# Patient Record
Sex: MALE | Race: WHITE | NOT HISPANIC OR LATINO | Employment: FULL TIME | URBAN - METROPOLITAN AREA
[De-identification: names, ages, dates, MRNs, and addresses within clinical notes are randomized per-mention and may not be internally consistent; named-entity substitution may affect disease eponyms.]

---

## 2017-02-02 ENCOUNTER — OFFICE VISIT - HEALTHEAST (OUTPATIENT)
Dept: FAMILY MEDICINE | Facility: CLINIC | Age: 62
End: 2017-02-02

## 2017-02-02 DIAGNOSIS — E78.00 HYPERCHOLESTEROLEMIA: ICD-10-CM

## 2017-02-02 DIAGNOSIS — E11.9 DIABETES MELLITUS (H): ICD-10-CM

## 2017-02-02 DIAGNOSIS — D36.9 ADENOMATOUS POLYP: ICD-10-CM

## 2017-02-02 DIAGNOSIS — I10 ESSENTIAL HYPERTENSION: ICD-10-CM

## 2017-02-02 LAB — HBA1C MFR BLD: 8.3 % (ref 3.5–6)

## 2017-02-02 ASSESSMENT — MIFFLIN-ST. JEOR: SCORE: 1608.86

## 2017-02-07 ENCOUNTER — COMMUNICATION - HEALTHEAST (OUTPATIENT)
Dept: FAMILY MEDICINE | Facility: CLINIC | Age: 62
End: 2017-02-07

## 2017-04-30 ENCOUNTER — COMMUNICATION - HEALTHEAST (OUTPATIENT)
Dept: FAMILY MEDICINE | Facility: CLINIC | Age: 62
End: 2017-04-30

## 2017-04-30 DIAGNOSIS — K21.9 GERD (GASTROESOPHAGEAL REFLUX DISEASE): ICD-10-CM

## 2017-05-04 ENCOUNTER — COMMUNICATION - HEALTHEAST (OUTPATIENT)
Dept: FAMILY MEDICINE | Facility: CLINIC | Age: 62
End: 2017-05-04

## 2017-05-04 DIAGNOSIS — K21.9 GERD (GASTROESOPHAGEAL REFLUX DISEASE): ICD-10-CM

## 2017-05-04 DIAGNOSIS — E11.9 TYPE 2 DIABETES MELLITUS (H): ICD-10-CM

## 2017-05-10 ENCOUNTER — RECORDS - HEALTHEAST (OUTPATIENT)
Dept: ADMINISTRATIVE | Facility: OTHER | Age: 62
End: 2017-05-10

## 2017-06-02 ENCOUNTER — OFFICE VISIT - HEALTHEAST (OUTPATIENT)
Dept: FAMILY MEDICINE | Facility: CLINIC | Age: 62
End: 2017-06-02

## 2017-06-02 ENCOUNTER — COMMUNICATION - HEALTHEAST (OUTPATIENT)
Dept: FAMILY MEDICINE | Facility: CLINIC | Age: 62
End: 2017-06-02

## 2017-06-02 DIAGNOSIS — E11.9 TYPE 2 DIABETES MELLITUS (H): ICD-10-CM

## 2017-06-02 DIAGNOSIS — I10 ESSENTIAL HYPERTENSION: ICD-10-CM

## 2017-06-02 DIAGNOSIS — E11.9 DIABETES MELLITUS (H): ICD-10-CM

## 2017-06-02 DIAGNOSIS — K21.9 GERD (GASTROESOPHAGEAL REFLUX DISEASE): ICD-10-CM

## 2017-06-02 DIAGNOSIS — E78.00 HYPERCHOLESTEROLEMIA: ICD-10-CM

## 2017-06-02 LAB — HBA1C MFR BLD: 10.1 % (ref 3.5–6)

## 2017-06-02 ASSESSMENT — MIFFLIN-ST. JEOR: SCORE: 1581.64

## 2017-06-05 ENCOUNTER — COMMUNICATION - HEALTHEAST (OUTPATIENT)
Dept: FAMILY MEDICINE | Facility: CLINIC | Age: 62
End: 2017-06-05

## 2017-09-11 ENCOUNTER — COMMUNICATION - HEALTHEAST (OUTPATIENT)
Dept: FAMILY MEDICINE | Facility: CLINIC | Age: 62
End: 2017-09-11

## 2017-09-11 DIAGNOSIS — E78.00 HYPERCHOLESTEROLEMIA: ICD-10-CM

## 2017-09-11 DIAGNOSIS — E11.9 DIABETES MELLITUS (H): ICD-10-CM

## 2017-09-14 ENCOUNTER — OFFICE VISIT - HEALTHEAST (OUTPATIENT)
Dept: FAMILY MEDICINE | Facility: CLINIC | Age: 62
End: 2017-09-14

## 2017-09-14 DIAGNOSIS — E11.9 DIABETES MELLITUS (H): ICD-10-CM

## 2017-09-14 DIAGNOSIS — I10 ESSENTIAL HYPERTENSION: ICD-10-CM

## 2017-09-14 DIAGNOSIS — B07.9 WART: ICD-10-CM

## 2017-09-14 DIAGNOSIS — L98.9 LESION OF SKIN OF SCALP: ICD-10-CM

## 2017-09-14 DIAGNOSIS — E78.00 HYPERCHOLESTEROLEMIA: ICD-10-CM

## 2017-09-14 LAB — HBA1C MFR BLD: 6.7 % (ref 3.5–6)

## 2017-09-14 ASSESSMENT — MIFFLIN-ST. JEOR: SCORE: 1600.92

## 2017-09-15 ENCOUNTER — COMMUNICATION - HEALTHEAST (OUTPATIENT)
Dept: FAMILY MEDICINE | Facility: CLINIC | Age: 62
End: 2017-09-15

## 2017-09-19 ENCOUNTER — AMBULATORY - HEALTHEAST (OUTPATIENT)
Dept: LAB | Facility: CLINIC | Age: 62
End: 2017-09-19

## 2017-09-19 ENCOUNTER — COMMUNICATION - HEALTHEAST (OUTPATIENT)
Dept: FAMILY MEDICINE | Facility: CLINIC | Age: 62
End: 2017-09-19

## 2017-09-19 DIAGNOSIS — E78.00 HYPERCHOLESTEROLEMIA: ICD-10-CM

## 2017-09-19 LAB
CHOLEST SERPL-MCNC: 174 MG/DL
FASTING STATUS PATIENT QL REPORTED: YES
HDLC SERPL-MCNC: 42 MG/DL
LDLC SERPL CALC-MCNC: 102 MG/DL
TRIGL SERPL-MCNC: 151 MG/DL

## 2017-11-20 ENCOUNTER — OFFICE VISIT - HEALTHEAST (OUTPATIENT)
Dept: FAMILY MEDICINE | Facility: CLINIC | Age: 62
End: 2017-11-20

## 2017-11-20 DIAGNOSIS — M67.431 GANGLION CYST OF WRIST, RIGHT: ICD-10-CM

## 2017-12-14 ENCOUNTER — OFFICE VISIT - HEALTHEAST (OUTPATIENT)
Dept: FAMILY MEDICINE | Facility: CLINIC | Age: 62
End: 2017-12-14

## 2017-12-14 DIAGNOSIS — E11.9 DIABETES MELLITUS (H): ICD-10-CM

## 2017-12-14 DIAGNOSIS — E78.00 HYPERCHOLESTEROLEMIA: ICD-10-CM

## 2017-12-14 DIAGNOSIS — I10 ESSENTIAL HYPERTENSION: ICD-10-CM

## 2017-12-14 LAB — HBA1C MFR BLD: 7.4 % (ref 3.5–6)

## 2017-12-14 ASSESSMENT — MIFFLIN-ST. JEOR: SCORE: 1627

## 2017-12-15 ENCOUNTER — RECORDS - HEALTHEAST (OUTPATIENT)
Dept: ADMINISTRATIVE | Facility: OTHER | Age: 62
End: 2017-12-15

## 2017-12-15 ENCOUNTER — COMMUNICATION - HEALTHEAST (OUTPATIENT)
Dept: FAMILY MEDICINE | Facility: CLINIC | Age: 62
End: 2017-12-15

## 2017-12-15 DIAGNOSIS — E11.9 DIABETES MELLITUS (H): ICD-10-CM

## 2017-12-19 ENCOUNTER — COMMUNICATION - HEALTHEAST (OUTPATIENT)
Dept: FAMILY MEDICINE | Facility: CLINIC | Age: 62
End: 2017-12-19

## 2017-12-19 DIAGNOSIS — E11.9 DIABETES MELLITUS (H): ICD-10-CM

## 2017-12-19 RX ORDER — GLUCOSAMINE HCL/CHONDROITIN SU 500-400 MG
1 CAPSULE ORAL 2 TIMES DAILY
Qty: 250 STRIP | Refills: 12 | Status: SHIPPED | OUTPATIENT
Start: 2017-12-19 | End: 2022-09-28

## 2017-12-20 ENCOUNTER — AMBULATORY - HEALTHEAST (OUTPATIENT)
Dept: NURSING | Facility: CLINIC | Age: 62
End: 2017-12-20

## 2017-12-20 DIAGNOSIS — Z00.00 HEALTHCARE MAINTENANCE: ICD-10-CM

## 2018-03-15 ENCOUNTER — OFFICE VISIT - HEALTHEAST (OUTPATIENT)
Dept: FAMILY MEDICINE | Facility: CLINIC | Age: 63
End: 2018-03-15

## 2018-03-15 DIAGNOSIS — R80.9 URINE TEST POSITIVE FOR MICROALBUMINURIA: ICD-10-CM

## 2018-03-15 DIAGNOSIS — I10 ESSENTIAL HYPERTENSION: ICD-10-CM

## 2018-03-15 DIAGNOSIS — M54.2 MUSCULOSKELETAL NECK PAIN: ICD-10-CM

## 2018-03-15 DIAGNOSIS — E11.9 TYPE 2 DIABETES MELLITUS (H): ICD-10-CM

## 2018-03-15 LAB
ALBUMIN SERPL-MCNC: 3.9 G/DL (ref 3.5–5)
ALP SERPL-CCNC: 70 U/L (ref 45–120)
ALT SERPL W P-5'-P-CCNC: 47 U/L (ref 0–45)
ANION GAP SERPL CALCULATED.3IONS-SCNC: 10 MMOL/L (ref 5–18)
AST SERPL W P-5'-P-CCNC: 20 U/L (ref 0–40)
BILIRUB SERPL-MCNC: 0.5 MG/DL (ref 0–1)
BUN SERPL-MCNC: 15 MG/DL (ref 8–22)
CALCIUM SERPL-MCNC: 9.8 MG/DL (ref 8.5–10.5)
CHLORIDE BLD-SCNC: 105 MMOL/L (ref 98–107)
CO2 SERPL-SCNC: 26 MMOL/L (ref 22–31)
CREAT SERPL-MCNC: 0.9 MG/DL (ref 0.7–1.3)
GFR SERPL CREATININE-BSD FRML MDRD: >60 ML/MIN/1.73M2
GLUCOSE BLD-MCNC: 93 MG/DL (ref 70–125)
HBA1C MFR BLD: 7.3 % (ref 3.5–6)
POTASSIUM BLD-SCNC: 4.5 MMOL/L (ref 3.5–5)
PROT SERPL-MCNC: 6.8 G/DL (ref 6–8)
SODIUM SERPL-SCNC: 141 MMOL/L (ref 136–145)

## 2018-03-15 ASSESSMENT — MIFFLIN-ST. JEOR: SCORE: 1622.47

## 2018-03-16 ENCOUNTER — COMMUNICATION - HEALTHEAST (OUTPATIENT)
Dept: FAMILY MEDICINE | Facility: CLINIC | Age: 63
End: 2018-03-16

## 2018-03-30 ENCOUNTER — OFFICE VISIT - HEALTHEAST (OUTPATIENT)
Dept: PHYSICAL THERAPY | Facility: REHABILITATION | Age: 63
End: 2018-03-30

## 2018-03-30 DIAGNOSIS — R29.3 ABNORMAL POSTURE: ICD-10-CM

## 2018-03-30 DIAGNOSIS — M43.6 NECK STIFFNESS: ICD-10-CM

## 2018-03-30 DIAGNOSIS — M54.2 NECK PAIN ON RIGHT SIDE: ICD-10-CM

## 2018-04-11 ENCOUNTER — OFFICE VISIT - HEALTHEAST (OUTPATIENT)
Dept: PHYSICAL THERAPY | Facility: REHABILITATION | Age: 63
End: 2018-04-11

## 2018-04-11 DIAGNOSIS — M43.6 NECK STIFFNESS: ICD-10-CM

## 2018-04-11 DIAGNOSIS — M54.2 NECK PAIN ON RIGHT SIDE: ICD-10-CM

## 2018-04-11 DIAGNOSIS — R29.3 ABNORMAL POSTURE: ICD-10-CM

## 2018-04-16 ENCOUNTER — OFFICE VISIT - HEALTHEAST (OUTPATIENT)
Dept: PHYSICAL THERAPY | Facility: REHABILITATION | Age: 63
End: 2018-04-16

## 2018-04-16 DIAGNOSIS — M54.2 NECK PAIN ON RIGHT SIDE: ICD-10-CM

## 2018-04-16 DIAGNOSIS — M43.6 NECK STIFFNESS: ICD-10-CM

## 2018-04-16 DIAGNOSIS — R29.3 ABNORMAL POSTURE: ICD-10-CM

## 2018-04-18 ENCOUNTER — OFFICE VISIT - HEALTHEAST (OUTPATIENT)
Dept: PHYSICAL THERAPY | Facility: REHABILITATION | Age: 63
End: 2018-04-18

## 2018-04-18 DIAGNOSIS — M43.6 NECK STIFFNESS: ICD-10-CM

## 2018-04-18 DIAGNOSIS — M54.2 NECK PAIN ON RIGHT SIDE: ICD-10-CM

## 2018-04-18 DIAGNOSIS — R29.3 ABNORMAL POSTURE: ICD-10-CM

## 2018-04-27 ENCOUNTER — OFFICE VISIT - HEALTHEAST (OUTPATIENT)
Dept: PHYSICAL THERAPY | Facility: REHABILITATION | Age: 63
End: 2018-04-27

## 2018-04-27 DIAGNOSIS — M43.6 NECK STIFFNESS: ICD-10-CM

## 2018-04-27 DIAGNOSIS — R29.3 ABNORMAL POSTURE: ICD-10-CM

## 2018-04-27 DIAGNOSIS — M54.2 NECK PAIN ON RIGHT SIDE: ICD-10-CM

## 2018-06-11 ENCOUNTER — COMMUNICATION - HEALTHEAST (OUTPATIENT)
Dept: FAMILY MEDICINE | Facility: CLINIC | Age: 63
End: 2018-06-11

## 2018-06-11 DIAGNOSIS — E11.9 DIABETES MELLITUS (H): ICD-10-CM

## 2018-07-02 ENCOUNTER — COMMUNICATION - HEALTHEAST (OUTPATIENT)
Dept: FAMILY MEDICINE | Facility: CLINIC | Age: 63
End: 2018-07-02

## 2018-07-02 DIAGNOSIS — E11.9 DIABETES MELLITUS (H): ICD-10-CM

## 2018-07-02 DIAGNOSIS — E11.9 TYPE 2 DIABETES MELLITUS (H): ICD-10-CM

## 2018-07-16 ENCOUNTER — OFFICE VISIT - HEALTHEAST (OUTPATIENT)
Dept: FAMILY MEDICINE | Facility: CLINIC | Age: 63
End: 2018-07-16

## 2018-07-16 DIAGNOSIS — E78.00 HYPERCHOLESTEROLEMIA: ICD-10-CM

## 2018-07-16 DIAGNOSIS — E11.9 TYPE 2 DIABETES MELLITUS (H): ICD-10-CM

## 2018-07-16 DIAGNOSIS — I10 ESSENTIAL HYPERTENSION: ICD-10-CM

## 2018-07-16 LAB
ALBUMIN SERPL-MCNC: 4.3 G/DL (ref 3.5–5)
ALP SERPL-CCNC: 70 U/L (ref 45–120)
ALT SERPL W P-5'-P-CCNC: 30 U/L (ref 0–45)
ANION GAP SERPL CALCULATED.3IONS-SCNC: 10 MMOL/L (ref 5–18)
AST SERPL W P-5'-P-CCNC: 16 U/L (ref 0–40)
BILIRUB SERPL-MCNC: 0.4 MG/DL (ref 0–1)
BUN SERPL-MCNC: 13 MG/DL (ref 8–22)
CALCIUM SERPL-MCNC: 9.7 MG/DL (ref 8.5–10.5)
CHLORIDE BLD-SCNC: 105 MMOL/L (ref 98–107)
CO2 SERPL-SCNC: 26 MMOL/L (ref 22–31)
CREAT SERPL-MCNC: 0.92 MG/DL (ref 0.7–1.3)
CREAT UR-MCNC: 221.2 MG/DL
GFR SERPL CREATININE-BSD FRML MDRD: >60 ML/MIN/1.73M2
GLUCOSE BLD-MCNC: 128 MG/DL (ref 70–125)
HBA1C MFR BLD: 7.1 % (ref 3.5–6)
MICROALBUMIN UR-MCNC: 1.35 MG/DL (ref 0–1.99)
MICROALBUMIN/CREAT UR: 6.1 MG/G
POTASSIUM BLD-SCNC: 4.7 MMOL/L (ref 3.5–5)
PROT SERPL-MCNC: 6.8 G/DL (ref 6–8)
SODIUM SERPL-SCNC: 141 MMOL/L (ref 136–145)

## 2018-07-16 ASSESSMENT — MIFFLIN-ST. JEOR: SCORE: 1617.93

## 2018-07-17 ENCOUNTER — COMMUNICATION - HEALTHEAST (OUTPATIENT)
Dept: FAMILY MEDICINE | Facility: CLINIC | Age: 63
End: 2018-07-17

## 2018-07-24 ENCOUNTER — COMMUNICATION - HEALTHEAST (OUTPATIENT)
Dept: FAMILY MEDICINE | Facility: CLINIC | Age: 63
End: 2018-07-24

## 2018-07-24 DIAGNOSIS — K21.9 GERD (GASTROESOPHAGEAL REFLUX DISEASE): ICD-10-CM

## 2018-10-15 ENCOUNTER — OFFICE VISIT - HEALTHEAST (OUTPATIENT)
Dept: FAMILY MEDICINE | Facility: CLINIC | Age: 63
End: 2018-10-15

## 2018-10-15 DIAGNOSIS — S39.012A STRAIN OF LUMBAR REGION, INITIAL ENCOUNTER: ICD-10-CM

## 2018-10-18 ENCOUNTER — OFFICE VISIT - HEALTHEAST (OUTPATIENT)
Dept: FAMILY MEDICINE | Facility: CLINIC | Age: 63
End: 2018-10-18

## 2018-10-18 DIAGNOSIS — M54.9 BACK PAIN: ICD-10-CM

## 2018-10-18 ASSESSMENT — MIFFLIN-ST. JEOR: SCORE: 1627

## 2018-11-19 ENCOUNTER — OFFICE VISIT - HEALTHEAST (OUTPATIENT)
Dept: FAMILY MEDICINE | Facility: CLINIC | Age: 63
End: 2018-11-19

## 2018-11-19 DIAGNOSIS — E11.8 TYPE 2 DIABETES MELLITUS WITH COMPLICATION, WITHOUT LONG-TERM CURRENT USE OF INSULIN (H): ICD-10-CM

## 2018-11-19 DIAGNOSIS — N52.9 ERECTILE DYSFUNCTION, UNSPECIFIED ERECTILE DYSFUNCTION TYPE: ICD-10-CM

## 2018-11-19 LAB
ALBUMIN SERPL-MCNC: 4.1 G/DL (ref 3.5–5)
ALP SERPL-CCNC: 77 U/L (ref 45–120)
ALT SERPL W P-5'-P-CCNC: 35 U/L (ref 0–45)
ANION GAP SERPL CALCULATED.3IONS-SCNC: 12 MMOL/L (ref 5–18)
AST SERPL W P-5'-P-CCNC: 18 U/L (ref 0–40)
BILIRUB SERPL-MCNC: 0.4 MG/DL (ref 0–1)
BUN SERPL-MCNC: 12 MG/DL (ref 8–22)
CALCIUM SERPL-MCNC: 9.8 MG/DL (ref 8.5–10.5)
CHLORIDE BLD-SCNC: 105 MMOL/L (ref 98–107)
CO2 SERPL-SCNC: 25 MMOL/L (ref 22–31)
CREAT SERPL-MCNC: 0.84 MG/DL (ref 0.7–1.3)
GFR SERPL CREATININE-BSD FRML MDRD: >60 ML/MIN/1.73M2
GLUCOSE BLD-MCNC: 97 MG/DL (ref 70–125)
HBA1C MFR BLD: 7 % (ref 3.5–6)
POTASSIUM BLD-SCNC: 4.8 MMOL/L (ref 3.5–5)
PROT SERPL-MCNC: 7 G/DL (ref 6–8)
SODIUM SERPL-SCNC: 142 MMOL/L (ref 136–145)

## 2018-11-19 ASSESSMENT — MIFFLIN-ST. JEOR: SCORE: 1617.93

## 2018-11-20 ENCOUNTER — COMMUNICATION - HEALTHEAST (OUTPATIENT)
Dept: FAMILY MEDICINE | Facility: CLINIC | Age: 63
End: 2018-11-20

## 2018-12-17 ENCOUNTER — RECORDS - HEALTHEAST (OUTPATIENT)
Dept: ADMINISTRATIVE | Facility: OTHER | Age: 63
End: 2018-12-17

## 2019-02-21 ENCOUNTER — OFFICE VISIT - HEALTHEAST (OUTPATIENT)
Dept: FAMILY MEDICINE | Facility: CLINIC | Age: 64
End: 2019-02-21

## 2019-02-21 DIAGNOSIS — E11.8 TYPE 2 DIABETES MELLITUS WITH COMPLICATION, WITHOUT LONG-TERM CURRENT USE OF INSULIN (H): ICD-10-CM

## 2019-02-21 DIAGNOSIS — N52.9 ERECTILE DYSFUNCTION, UNSPECIFIED ERECTILE DYSFUNCTION TYPE: ICD-10-CM

## 2019-02-21 LAB — HBA1C MFR BLD: 7.3 % (ref 3.5–6)

## 2019-02-21 ASSESSMENT — MIFFLIN-ST. JEOR: SCORE: 1617.93

## 2019-02-22 ENCOUNTER — COMMUNICATION - HEALTHEAST (OUTPATIENT)
Dept: FAMILY MEDICINE | Facility: CLINIC | Age: 64
End: 2019-02-22

## 2019-02-23 ENCOUNTER — COMMUNICATION - HEALTHEAST (OUTPATIENT)
Dept: FAMILY MEDICINE | Facility: CLINIC | Age: 64
End: 2019-02-23

## 2019-02-23 DIAGNOSIS — E11.9 DIABETES MELLITUS (H): ICD-10-CM

## 2019-02-23 DIAGNOSIS — E78.00 HYPERCHOLESTEROLEMIA: ICD-10-CM

## 2019-02-27 ENCOUNTER — COMMUNICATION - HEALTHEAST (OUTPATIENT)
Dept: FAMILY MEDICINE | Facility: CLINIC | Age: 64
End: 2019-02-27

## 2019-02-27 DIAGNOSIS — E11.9 DIABETES MELLITUS (H): ICD-10-CM

## 2019-05-21 ENCOUNTER — COMMUNICATION - HEALTHEAST (OUTPATIENT)
Dept: FAMILY MEDICINE | Facility: CLINIC | Age: 64
End: 2019-05-21

## 2019-05-21 DIAGNOSIS — E11.9 DIABETES MELLITUS (H): ICD-10-CM

## 2019-05-26 ENCOUNTER — COMMUNICATION - HEALTHEAST (OUTPATIENT)
Dept: FAMILY MEDICINE | Facility: CLINIC | Age: 64
End: 2019-05-26

## 2019-05-26 DIAGNOSIS — E11.9 TYPE 2 DIABETES MELLITUS (H): ICD-10-CM

## 2019-05-26 DIAGNOSIS — E11.9 DIABETES MELLITUS (H): ICD-10-CM

## 2019-06-29 ENCOUNTER — COMMUNICATION - HEALTHEAST (OUTPATIENT)
Dept: FAMILY MEDICINE | Facility: CLINIC | Age: 64
End: 2019-06-29

## 2019-06-29 DIAGNOSIS — E11.9 DIABETES MELLITUS (H): ICD-10-CM

## 2019-07-15 ENCOUNTER — COMMUNICATION - HEALTHEAST (OUTPATIENT)
Dept: FAMILY MEDICINE | Facility: CLINIC | Age: 64
End: 2019-07-15

## 2019-07-15 DIAGNOSIS — R12 HEARTBURN: ICD-10-CM

## 2019-07-17 ENCOUNTER — RECORDS - HEALTHEAST (OUTPATIENT)
Dept: HEALTH INFORMATION MANAGEMENT | Facility: CLINIC | Age: 64
End: 2019-07-17

## 2019-07-29 ENCOUNTER — OFFICE VISIT - HEALTHEAST (OUTPATIENT)
Dept: FAMILY MEDICINE | Facility: CLINIC | Age: 64
End: 2019-07-29

## 2019-07-29 DIAGNOSIS — Z11.4 SCREENING FOR HIV (HUMAN IMMUNODEFICIENCY VIRUS): ICD-10-CM

## 2019-07-29 DIAGNOSIS — Z11.59 ENCOUNTER FOR HEPATITIS C SCREENING TEST FOR LOW RISK PATIENT: ICD-10-CM

## 2019-07-29 DIAGNOSIS — M67.40 MUCOID CYST OF JOINT: ICD-10-CM

## 2019-07-29 DIAGNOSIS — L57.0 ACTINIC KERATOSIS: ICD-10-CM

## 2019-07-29 DIAGNOSIS — E11.9 TYPE 2 DIABETES MELLITUS (H): ICD-10-CM

## 2019-07-29 DIAGNOSIS — M76.31 IT BAND SYNDROME, RIGHT: ICD-10-CM

## 2019-07-29 LAB
CHOLEST SERPL-MCNC: 186 MG/DL
CREAT UR-MCNC: 246.3 MG/DL
FASTING STATUS PATIENT QL REPORTED: NO
HBA1C MFR BLD: 8.1 % (ref 3.5–6)
HDLC SERPL-MCNC: 43 MG/DL
HIV 1+2 AB+HIV1 P24 AG SERPL QL IA: NEGATIVE
LDLC SERPL CALC-MCNC: 104 MG/DL
MICROALBUMIN UR-MCNC: 2.62 MG/DL (ref 0–1.99)
MICROALBUMIN/CREAT UR: 10.6 MG/G
TRIGL SERPL-MCNC: 197 MG/DL

## 2019-07-29 ASSESSMENT — MIFFLIN-ST. JEOR: SCORE: 1608.86

## 2019-07-30 LAB — HCV AB SERPL QL IA: NEGATIVE

## 2019-08-08 ENCOUNTER — RECORDS - HEALTHEAST (OUTPATIENT)
Dept: ADMINISTRATIVE | Facility: OTHER | Age: 64
End: 2019-08-08

## 2019-09-09 ENCOUNTER — OFFICE VISIT - HEALTHEAST (OUTPATIENT)
Dept: FAMILY MEDICINE | Facility: CLINIC | Age: 64
End: 2019-09-09

## 2019-09-09 DIAGNOSIS — L57.0 ACTINIC KERATOSIS: ICD-10-CM

## 2019-09-09 ASSESSMENT — MIFFLIN-ST. JEOR: SCORE: 1613.4

## 2019-11-04 ENCOUNTER — OFFICE VISIT - HEALTHEAST (OUTPATIENT)
Dept: FAMILY MEDICINE | Facility: CLINIC | Age: 64
End: 2019-11-04

## 2019-11-04 DIAGNOSIS — L57.0 ACTINIC KERATOSIS: ICD-10-CM

## 2019-11-04 DIAGNOSIS — N52.9 ERECTILE DYSFUNCTION, UNSPECIFIED ERECTILE DYSFUNCTION TYPE: ICD-10-CM

## 2019-11-04 ASSESSMENT — MIFFLIN-ST. JEOR: SCORE: 1627

## 2019-11-12 ENCOUNTER — COMMUNICATION - HEALTHEAST (OUTPATIENT)
Dept: FAMILY MEDICINE | Facility: CLINIC | Age: 64
End: 2019-11-12

## 2019-11-12 DIAGNOSIS — E11.9 DIABETES MELLITUS (H): ICD-10-CM

## 2020-01-09 ENCOUNTER — RECORDS - HEALTHEAST (OUTPATIENT)
Dept: ADMINISTRATIVE | Facility: OTHER | Age: 65
End: 2020-01-09

## 2020-01-30 ENCOUNTER — OFFICE VISIT - HEALTHEAST (OUTPATIENT)
Dept: FAMILY MEDICINE | Facility: CLINIC | Age: 65
End: 2020-01-30

## 2020-01-30 DIAGNOSIS — E11.9 DIABETES MELLITUS (H): ICD-10-CM

## 2020-01-30 DIAGNOSIS — L57.0 ACTINIC KERATOSIS: ICD-10-CM

## 2020-01-30 DIAGNOSIS — L98.9 SKIN LESION: ICD-10-CM

## 2020-01-30 DIAGNOSIS — I10 ESSENTIAL HYPERTENSION: ICD-10-CM

## 2020-01-30 DIAGNOSIS — E11.65 TYPE 2 DIABETES MELLITUS WITH HYPERGLYCEMIA, WITHOUT LONG-TERM CURRENT USE OF INSULIN (H): ICD-10-CM

## 2020-01-30 DIAGNOSIS — E78.00 HYPERCHOLESTEROLEMIA: ICD-10-CM

## 2020-01-30 LAB
ALBUMIN SERPL-MCNC: 4.1 G/DL (ref 3.5–5)
ALP SERPL-CCNC: 72 U/L (ref 45–120)
ALT SERPL W P-5'-P-CCNC: 38 U/L (ref 0–45)
ANION GAP SERPL CALCULATED.3IONS-SCNC: 8 MMOL/L (ref 5–18)
AST SERPL W P-5'-P-CCNC: 17 U/L (ref 0–40)
BILIRUB SERPL-MCNC: 0.6 MG/DL (ref 0–1)
BUN SERPL-MCNC: 11 MG/DL (ref 8–22)
CALCIUM SERPL-MCNC: 9.5 MG/DL (ref 8.5–10.5)
CHLORIDE BLD-SCNC: 104 MMOL/L (ref 98–107)
CO2 SERPL-SCNC: 29 MMOL/L (ref 22–31)
CREAT SERPL-MCNC: 0.94 MG/DL (ref 0.7–1.3)
GFR SERPL CREATININE-BSD FRML MDRD: >60 ML/MIN/1.73M2
GLUCOSE BLD-MCNC: 161 MG/DL (ref 70–125)
HBA1C MFR BLD: 8.9 % (ref 3.5–6)
POTASSIUM BLD-SCNC: 4.2 MMOL/L (ref 3.5–5)
PROT SERPL-MCNC: 6.6 G/DL (ref 6–8)
SODIUM SERPL-SCNC: 141 MMOL/L (ref 136–145)

## 2020-01-30 ASSESSMENT — MIFFLIN-ST. JEOR: SCORE: 1621.34

## 2020-01-31 LAB
LAB AP CHARGES (HE HISTORICAL CONVERSION): NORMAL
PATH REPORT.COMMENTS IMP SPEC: NORMAL
PATH REPORT.FINAL DX SPEC: NORMAL
PATH REPORT.GROSS SPEC: NORMAL
PATH REPORT.MICROSCOPIC SPEC OTHER STN: NORMAL
PATH REPORT.RELEVANT HX SPEC: NORMAL
RESULT FLAG (HE HISTORICAL CONVERSION): NORMAL

## 2020-02-03 ENCOUNTER — RECORDS - HEALTHEAST (OUTPATIENT)
Dept: ADMINISTRATIVE | Facility: OTHER | Age: 65
End: 2020-02-03

## 2020-02-08 ENCOUNTER — COMMUNICATION - HEALTHEAST (OUTPATIENT)
Dept: FAMILY MEDICINE | Facility: CLINIC | Age: 65
End: 2020-02-08

## 2020-02-08 DIAGNOSIS — E11.9 DIABETES MELLITUS (H): ICD-10-CM

## 2020-02-13 ENCOUNTER — OFFICE VISIT - HEALTHEAST (OUTPATIENT)
Dept: FAMILY MEDICINE | Facility: CLINIC | Age: 65
End: 2020-02-13

## 2020-02-13 DIAGNOSIS — L03.90 CELLULITIS, UNSPECIFIED CELLULITIS SITE: ICD-10-CM

## 2020-02-13 ASSESSMENT — MIFFLIN-ST. JEOR: SCORE: 1614.54

## 2020-02-15 ENCOUNTER — COMMUNICATION - HEALTHEAST (OUTPATIENT)
Dept: FAMILY MEDICINE | Facility: CLINIC | Age: 65
End: 2020-02-15

## 2020-02-15 DIAGNOSIS — E11.9 DIABETES MELLITUS (H): ICD-10-CM

## 2020-02-15 DIAGNOSIS — E78.00 HYPERCHOLESTEROLEMIA: ICD-10-CM

## 2020-04-05 ENCOUNTER — COMMUNICATION - HEALTHEAST (OUTPATIENT)
Dept: FAMILY MEDICINE | Facility: CLINIC | Age: 65
End: 2020-04-05

## 2020-04-05 DIAGNOSIS — R12 HEARTBURN: ICD-10-CM

## 2020-05-04 ENCOUNTER — OFFICE VISIT - HEALTHEAST (OUTPATIENT)
Dept: FAMILY MEDICINE | Facility: CLINIC | Age: 65
End: 2020-05-04

## 2020-05-04 DIAGNOSIS — E11.9 DIABETES MELLITUS (H): ICD-10-CM

## 2020-05-04 DIAGNOSIS — E78.00 HYPERCHOLESTEROLEMIA: ICD-10-CM

## 2020-05-04 DIAGNOSIS — E11.9 TYPE 2 DIABETES MELLITUS (H): ICD-10-CM

## 2020-07-13 ENCOUNTER — OFFICE VISIT - HEALTHEAST (OUTPATIENT)
Dept: FAMILY MEDICINE | Facility: CLINIC | Age: 65
End: 2020-07-13

## 2020-07-13 DIAGNOSIS — E11.65 TYPE 2 DIABETES MELLITUS WITH HYPERGLYCEMIA, WITHOUT LONG-TERM CURRENT USE OF INSULIN (H): ICD-10-CM

## 2020-07-13 DIAGNOSIS — E78.00 HYPERCHOLESTEROLEMIA: ICD-10-CM

## 2020-07-13 DIAGNOSIS — E11.9 DIABETES MELLITUS (H): ICD-10-CM

## 2020-07-13 DIAGNOSIS — B07.9 VIRAL WARTS, UNSPECIFIED TYPE: ICD-10-CM

## 2020-07-13 DIAGNOSIS — E11.9 TYPE 2 DIABETES MELLITUS (H): ICD-10-CM

## 2020-07-13 LAB
ALBUMIN SERPL-MCNC: 4.2 G/DL (ref 3.5–5)
ALP SERPL-CCNC: 79 U/L (ref 45–120)
ALT SERPL W P-5'-P-CCNC: 23 U/L (ref 0–45)
ANION GAP SERPL CALCULATED.3IONS-SCNC: 9 MMOL/L (ref 5–18)
AST SERPL W P-5'-P-CCNC: 13 U/L (ref 0–40)
BILIRUB SERPL-MCNC: 0.5 MG/DL (ref 0–1)
BUN SERPL-MCNC: 16 MG/DL (ref 8–22)
CALCIUM SERPL-MCNC: 9.8 MG/DL (ref 8.5–10.5)
CHLORIDE BLD-SCNC: 104 MMOL/L (ref 98–107)
CHOLEST SERPL-MCNC: 190 MG/DL
CO2 SERPL-SCNC: 27 MMOL/L (ref 22–31)
CREAT SERPL-MCNC: 1.08 MG/DL (ref 0.7–1.3)
FASTING STATUS PATIENT QL REPORTED: ABNORMAL
GFR SERPL CREATININE-BSD FRML MDRD: >60 ML/MIN/1.73M2
GLUCOSE BLD-MCNC: 92 MG/DL (ref 70–125)
HBA1C MFR BLD: 8.2 % (ref 3.5–6)
HDLC SERPL-MCNC: 44 MG/DL
LDLC SERPL CALC-MCNC: 106 MG/DL
POTASSIUM BLD-SCNC: 4.4 MMOL/L (ref 3.5–5)
PROT SERPL-MCNC: 7.1 G/DL (ref 6–8)
SODIUM SERPL-SCNC: 140 MMOL/L (ref 136–145)
TRIGL SERPL-MCNC: 198 MG/DL

## 2020-07-13 ASSESSMENT — MIFFLIN-ST. JEOR: SCORE: 1595.26

## 2020-07-14 ENCOUNTER — COMMUNICATION - HEALTHEAST (OUTPATIENT)
Dept: FAMILY MEDICINE | Facility: CLINIC | Age: 65
End: 2020-07-14

## 2020-08-15 ENCOUNTER — COMMUNICATION - HEALTHEAST (OUTPATIENT)
Dept: FAMILY MEDICINE | Facility: CLINIC | Age: 65
End: 2020-08-15

## 2020-08-15 DIAGNOSIS — E11.9 DIABETES MELLITUS (H): ICD-10-CM

## 2020-09-30 ENCOUNTER — COMMUNICATION - HEALTHEAST (OUTPATIENT)
Dept: FAMILY MEDICINE | Facility: CLINIC | Age: 65
End: 2020-09-30

## 2020-09-30 DIAGNOSIS — E11.9 DIABETES MELLITUS (H): ICD-10-CM

## 2020-09-30 DIAGNOSIS — E11.9 TYPE 2 DIABETES MELLITUS (H): ICD-10-CM

## 2020-10-05 RX ORDER — METFORMIN HYDROCHLORIDE 750 MG/1
TABLET, EXTENDED RELEASE ORAL
Qty: 180 TABLET | Refills: 3 | Status: SHIPPED | OUTPATIENT
Start: 2020-10-05 | End: 2022-01-05

## 2020-11-13 ENCOUNTER — OFFICE VISIT - HEALTHEAST (OUTPATIENT)
Dept: FAMILY MEDICINE | Facility: CLINIC | Age: 65
End: 2020-11-13

## 2020-11-13 DIAGNOSIS — E11.65 TYPE 2 DIABETES MELLITUS WITH HYPERGLYCEMIA, WITHOUT LONG-TERM CURRENT USE OF INSULIN (H): ICD-10-CM

## 2020-11-29 ENCOUNTER — COMMUNICATION - HEALTHEAST (OUTPATIENT)
Dept: FAMILY MEDICINE | Facility: CLINIC | Age: 65
End: 2020-11-29

## 2020-11-29 DIAGNOSIS — N52.9 ERECTILE DYSFUNCTION, UNSPECIFIED ERECTILE DYSFUNCTION TYPE: ICD-10-CM

## 2021-01-14 ENCOUNTER — RECORDS - HEALTHEAST (OUTPATIENT)
Dept: ADMINISTRATIVE | Facility: OTHER | Age: 66
End: 2021-01-14

## 2021-01-14 LAB — RETINOPATHY: NEGATIVE

## 2021-01-18 ENCOUNTER — RECORDS - HEALTHEAST (OUTPATIENT)
Dept: HEALTH INFORMATION MANAGEMENT | Facility: CLINIC | Age: 66
End: 2021-01-18

## 2021-01-19 ENCOUNTER — COMMUNICATION - HEALTHEAST (OUTPATIENT)
Dept: FAMILY MEDICINE | Facility: CLINIC | Age: 66
End: 2021-01-19

## 2021-01-19 ENCOUNTER — OFFICE VISIT - HEALTHEAST (OUTPATIENT)
Dept: FAMILY MEDICINE | Facility: CLINIC | Age: 66
End: 2021-01-19

## 2021-01-19 DIAGNOSIS — E11.9 DIABETES MELLITUS (H): ICD-10-CM

## 2021-01-19 DIAGNOSIS — L57.0 ACTINIC KERATOSIS: ICD-10-CM

## 2021-01-19 DIAGNOSIS — I10 ESSENTIAL HYPERTENSION: ICD-10-CM

## 2021-01-19 DIAGNOSIS — R06.83 SNORES: ICD-10-CM

## 2021-01-19 DIAGNOSIS — E11.65 TYPE 2 DIABETES MELLITUS WITH HYPERGLYCEMIA, WITHOUT LONG-TERM CURRENT USE OF INSULIN (H): ICD-10-CM

## 2021-01-19 LAB
ALBUMIN SERPL-MCNC: 4.1 G/DL (ref 3.5–5)
ALP SERPL-CCNC: 70 U/L (ref 45–120)
ALT SERPL W P-5'-P-CCNC: 28 U/L (ref 0–45)
ANION GAP SERPL CALCULATED.3IONS-SCNC: 12 MMOL/L (ref 5–18)
AST SERPL W P-5'-P-CCNC: 13 U/L (ref 0–40)
BILIRUB SERPL-MCNC: 0.5 MG/DL (ref 0–1)
BUN SERPL-MCNC: 15 MG/DL (ref 8–22)
CALCIUM SERPL-MCNC: 9.1 MG/DL (ref 8.5–10.5)
CHLORIDE BLD-SCNC: 105 MMOL/L (ref 98–107)
CO2 SERPL-SCNC: 23 MMOL/L (ref 22–31)
CREAT SERPL-MCNC: 1.01 MG/DL (ref 0.7–1.3)
CREAT UR-MCNC: 447.6 MG/DL
GFR SERPL CREATININE-BSD FRML MDRD: >60 ML/MIN/1.73M2
GLUCOSE BLD-MCNC: 193 MG/DL (ref 70–125)
HBA1C MFR BLD: 8.9 %
MICROALBUMIN UR-MCNC: 5.26 MG/DL (ref 0–1.99)
MICROALBUMIN/CREAT UR: 11.8 MG/G
POTASSIUM BLD-SCNC: 4.3 MMOL/L (ref 3.5–5)
PROT SERPL-MCNC: 6.7 G/DL (ref 6–8)
SODIUM SERPL-SCNC: 140 MMOL/L (ref 136–145)

## 2021-01-19 ASSESSMENT — MIFFLIN-ST. JEOR: SCORE: 1610

## 2021-01-20 ENCOUNTER — COMMUNICATION - HEALTHEAST (OUTPATIENT)
Dept: FAMILY MEDICINE | Facility: CLINIC | Age: 66
End: 2021-01-20

## 2021-01-20 DIAGNOSIS — E11.65 TYPE 2 DIABETES MELLITUS WITH HYPERGLYCEMIA, WITHOUT LONG-TERM CURRENT USE OF INSULIN (H): ICD-10-CM

## 2021-01-21 DIAGNOSIS — I10 ESSENTIAL HYPERTENSION: ICD-10-CM

## 2021-01-21 DIAGNOSIS — R06.83 SNORING: Primary | ICD-10-CM

## 2021-01-26 ENCOUNTER — OFFICE VISIT - HEALTHEAST (OUTPATIENT)
Dept: FAMILY MEDICINE | Facility: CLINIC | Age: 66
End: 2021-01-26

## 2021-01-26 DIAGNOSIS — I10 ESSENTIAL HYPERTENSION: ICD-10-CM

## 2021-01-26 RX ORDER — ADHESIVE BANDAGE 3/4"
1 BANDAGE TOPICAL DAILY
Qty: 1 EACH | Refills: 0 | Status: SHIPPED | OUTPATIENT
Start: 2021-01-26

## 2021-02-06 ENCOUNTER — COMMUNICATION - HEALTHEAST (OUTPATIENT)
Dept: FAMILY MEDICINE | Facility: CLINIC | Age: 66
End: 2021-02-06

## 2021-02-06 DIAGNOSIS — E78.00 HYPERCHOLESTEROLEMIA: ICD-10-CM

## 2021-02-06 DIAGNOSIS — E11.9 DIABETES MELLITUS (H): ICD-10-CM

## 2021-02-06 RX ORDER — ATORVASTATIN CALCIUM 40 MG/1
TABLET, FILM COATED ORAL
Qty: 90 TABLET | Refills: 3 | Status: SHIPPED | OUTPATIENT
Start: 2021-02-06 | End: 2022-01-28

## 2021-02-07 ENCOUNTER — COMMUNICATION - HEALTHEAST (OUTPATIENT)
Dept: FAMILY MEDICINE | Facility: CLINIC | Age: 66
End: 2021-02-07

## 2021-02-07 DIAGNOSIS — E11.9 DIABETES MELLITUS (H): ICD-10-CM

## 2021-02-26 ENCOUNTER — COMMUNICATION - HEALTHEAST (OUTPATIENT)
Dept: FAMILY MEDICINE | Facility: CLINIC | Age: 66
End: 2021-02-26

## 2021-02-26 DIAGNOSIS — E11.9 DIABETES MELLITUS (H): ICD-10-CM

## 2021-02-26 RX ORDER — LISINOPRIL 40 MG/1
TABLET ORAL
Qty: 90 TABLET | Refills: 3 | Status: SHIPPED | OUTPATIENT
Start: 2021-02-26 | End: 2022-02-16

## 2021-03-24 ENCOUNTER — COMMUNICATION - HEALTHEAST (OUTPATIENT)
Dept: FAMILY MEDICINE | Facility: CLINIC | Age: 66
End: 2021-03-24

## 2021-03-24 DIAGNOSIS — R12 HEARTBURN: ICD-10-CM

## 2021-03-25 ENCOUNTER — COMMUNICATION - HEALTHEAST (OUTPATIENT)
Dept: FAMILY MEDICINE | Facility: CLINIC | Age: 66
End: 2021-03-25

## 2021-03-25 DIAGNOSIS — E11.65 TYPE 2 DIABETES MELLITUS WITH HYPERGLYCEMIA, WITHOUT LONG-TERM CURRENT USE OF INSULIN (H): ICD-10-CM

## 2021-03-25 RX ORDER — GLUCOSAMINE HCL/CHONDROITIN SU 500-400 MG
1 CAPSULE ORAL DAILY
Qty: 100 STRIP | Refills: 8 | Status: SHIPPED | OUTPATIENT
Start: 2021-03-25 | End: 2022-01-20

## 2021-04-19 ENCOUNTER — OFFICE VISIT - HEALTHEAST (OUTPATIENT)
Dept: FAMILY MEDICINE | Facility: CLINIC | Age: 66
End: 2021-04-19

## 2021-04-19 DIAGNOSIS — E11.65 TYPE 2 DIABETES MELLITUS WITH HYPERGLYCEMIA, WITHOUT LONG-TERM CURRENT USE OF INSULIN (H): ICD-10-CM

## 2021-04-19 DIAGNOSIS — I10 ESSENTIAL HYPERTENSION: ICD-10-CM

## 2021-04-19 DIAGNOSIS — N52.9 ERECTILE DYSFUNCTION, UNSPECIFIED ERECTILE DYSFUNCTION TYPE: ICD-10-CM

## 2021-04-19 LAB — HBA1C MFR BLD: 7.8 %

## 2021-04-19 RX ORDER — SILDENAFIL 100 MG/1
TABLET, FILM COATED ORAL
Qty: 12 TABLET | Refills: 10 | Status: SHIPPED | OUTPATIENT
Start: 2021-04-19

## 2021-04-19 ASSESSMENT — MIFFLIN-ST. JEOR: SCORE: 1569.72

## 2021-05-23 ENCOUNTER — COMMUNICATION - HEALTHEAST (OUTPATIENT)
Dept: FAMILY MEDICINE | Facility: CLINIC | Age: 66
End: 2021-05-23

## 2021-05-23 DIAGNOSIS — E11.9 DIABETES MELLITUS (H): ICD-10-CM

## 2021-05-24 RX ORDER — GLIPIZIDE 10 MG/1
TABLET ORAL
Qty: 180 TABLET | Refills: 2 | Status: SHIPPED | OUTPATIENT
Start: 2021-05-24 | End: 2022-02-14

## 2021-05-28 ENCOUNTER — RECORDS - HEALTHEAST (OUTPATIENT)
Dept: ADMINISTRATIVE | Facility: CLINIC | Age: 66
End: 2021-05-28

## 2021-05-29 NOTE — TELEPHONE ENCOUNTER
Refill Approved    Rx renewed per Medication Renewal Policy. Medication was last renewed on 7/5/18.    Eden Bartlett, [facility] Triage/Med Refill 5/26/2019     Requested Prescriptions   Pending Prescriptions Disp Refills     metFORMIN (GLUCOPHAGE-XR) 750 MG 24 hr tablet [Pharmacy Med Name: METFORMIN HCL  MG TABLET] 60 tablet 11     Sig: TAKE 2 TABLETS BY MOUTH ONCE DAILY WITH BREAKFAST       Metformin Refill Protocol Passed - 5/26/2019 12:18 AM        Passed - Blood pressure in last 12 months     BP Readings from Last 1 Encounters:   02/21/19 136/70             Passed - LFT or AST or ALT in last 12 months     Albumin   Date Value Ref Range Status   11/19/2018 4.1 3.5 - 5.0 g/dL Final     Bilirubin, Total   Date Value Ref Range Status   11/19/2018 0.4 0.0 - 1.0 mg/dL Final     Bilirubin, Direct   Date Value Ref Range Status   08/05/2011 0.17 <0.31 mg/dL Final     Alkaline Phosphatase   Date Value Ref Range Status   11/19/2018 77 45 - 120 U/L Final     AST   Date Value Ref Range Status   11/19/2018 18 0 - 40 U/L Final     ALT   Date Value Ref Range Status   11/19/2018 35 0 - 45 U/L Final     Protein, Total   Date Value Ref Range Status   11/19/2018 7.0 6.0 - 8.0 g/dL Final                Passed - GFR or Serum Creatinine in last 6 months     GFR MDRD Non Af Amer   Date Value Ref Range Status   11/19/2018 >60 >60 mL/min/1.73m2 Final     GFR MDRD Af Amer   Date Value Ref Range Status   11/19/2018 >60 >60 mL/min/1.73m2 Final             Passed - Visit with PCP or prescribing provider visit in last 6 months or next 3 months     Last office visit with prescriber/PCP: 2/21/2019 OR same dept: 2/21/2019 Thelma Mendoza MD OR same specialty: 2/21/2019 Thelma Mendoza MD Last physical: Visit date not found Last MTM visit: Visit date not found         Next appt within 3 mo: Visit date not found  Next physical within 3 mo: Visit date not found  Prescriber OR PCP: Thelma Mendoza MD  Last diagnosis  associated with med order: 1. Type 2 diabetes mellitus (H)  - metFORMIN (GLUCOPHAGE-XR) 750 MG 24 hr tablet [Pharmacy Med Name: METFORMIN HCL  MG TABLET]; TAKE 2 TABLETS BY MOUTH ONCE DAILY WITH BREAKFAST  Dispense: 60 tablet; Refill: 11    2. Diabetes mellitus (H)  - metFORMIN (GLUCOPHAGE-XR) 750 MG 24 hr tablet [Pharmacy Med Name: METFORMIN HCL  MG TABLET]; TAKE 2 TABLETS BY MOUTH ONCE DAILY WITH BREAKFAST  Dispense: 60 tablet; Refill: 11     If protocol passes may refill for 12 months if within 3 months of last provider visit (or a total of 15 months).           Passed - A1C in last 6 months     Hemoglobin A1c   Date Value Ref Range Status   02/21/2019 7.3 (H) 3.5 - 6.0 % Final               Passed - Microalbumin in last year      Microalbumin, Random Urine   Date Value Ref Range Status   07/16/2018 1.35 0.00 - 1.99 mg/dL Final

## 2021-05-29 NOTE — TELEPHONE ENCOUNTER
Refill Approved    Rx renewed per Medication Renewal Policy. Medication was last renewed on 2/27/19.    Janet Portillo, [facility] Triage/Med Refill 5/22/2019     Requested Prescriptions   Pending Prescriptions Disp Refills     lisinopril (PRINIVIL,ZESTRIL) 20 MG tablet [Pharmacy Med Name: LISINOPRIL 20 MG TABLET] 90 tablet 0     Sig: TAKE 1 TABLET (20 MG TOTAL) BY MOUTH DAILY.       Ace Inhibitors Refill Protocol Passed - 5/21/2019 11:32 AM        Passed - PCP or prescribing provider visit in past 12 months       Last office visit with prescriber/PCP: 2/21/2019 Thelma Mendoza MD OR same dept: 2/21/2019 Thelma Mendoza MD OR same specialty: 2/21/2019 Thelma Mendoza MD  Last physical: Visit date not found Last MTM visit: Visit date not found   Next visit within 3 mo: Visit date not found  Next physical within 3 mo: Visit date not found  Prescriber OR PCP: Thelma Mendoza MD  Last diagnosis associated with med order: 1. Diabetes mellitus (H)  - lisinopril (PRINIVIL,ZESTRIL) 20 MG tablet [Pharmacy Med Name: LISINOPRIL 20 MG TABLET]; Take 1 tablet (20 mg total) by mouth daily.  Dispense: 90 tablet; Refill: 0    If protocol passes may refill for 12 months if within 3 months of last provider visit (or a total of 15 months).             Passed - Serum Potassium in past 12 months     Lab Results   Component Value Date    Potassium 4.8 11/19/2018             Passed - Blood pressure filed in past 12 months     BP Readings from Last 1 Encounters:   02/21/19 136/70             Passed - Serum Creatinine in past 12 months     Creatinine   Date Value Ref Range Status   11/19/2018 0.84 0.70 - 1.30 mg/dL Final

## 2021-05-30 VITALS — BODY MASS INDEX: 31.5 KG/M2 | HEIGHT: 66 IN | WEIGHT: 196 LBS

## 2021-05-30 NOTE — PROGRESS NOTES
"S:  Rene Guzman is a 64 y.o. male who comes to the clinic today for  1.  Type 2 diabetes:  He has not been very active this summer due to pain in his right hip.    He is curently doing 5mg po two times a day glipizide.  And metformin 750mg po daily.  He denies any cp, sobr.      2.  Pain in right hip.  From right hip to right knee.  Better with going to the chiropracter.  Long history of pain in right hip.  Worse with getting up in the am.  It has improved.  He hasn't golfed yet this year due to the leg.      3.  Spot on left arm that is not healing.  It is scaly.  Not itchy.  Doesn't hurt.  He hasn't put anything on it.      4.  Spot on right hand, pointer finger that has been present for several months and is slightly larger than it was.  Non tender.  No history of similar.      I reviewed the pertinent family, social, surgical, medical history.    His granddaughter Reilly just got her insulin pump.    He is due for his next shingrix shot.        O:  /76   Pulse 87   Temp 98  F (36.7  C) (Oral)   Resp 16   Ht 5' 5.5\" (1.664 m)   Wt 196 lb (88.9 kg)   SpO2 98%   BMI 32.12 kg/m    Gen: no acute distress  Neck:  Supple, no lad, no carotid bruits  Heart:  Regular rate and rhythm.  No m/r/g  Lungs: cta bilaterally, no wheezes or rhonchi.  Good air inspiration  Abdomen:  No masses or organomegaly  Extremities:  No edema.   Ganglion cyst noted on the right wrist.  He also has a small 1 cm bump on the posterior aspect of the right pointer finger.  This is nontender.  I did not attempt to transilluminate it today.  It is almost ballotable.  No erythema noted.  His full range of motion in his right hip.  Tender over the area of the right IT band.  This palpates his pain that has been having.  Skin: Area of erythema and flaking on the top of the scalp.  There is another area on his left arm.        Patient Active Problem List   Diagnosis     Heartburn     Acute Serous Otitis Media     Joint Pain, Localized " In The Knee     Synovial Cyst Of Popliteal Space     Hypertension     Seborrheic Keratosis     Abdominal Pain     Hyperlipidemia     Joint Pain, Localized In The Shoulder     Type 2 diabetes mellitus (H)     Tubular adenoma of colon     Current Outpatient Medications on File Prior to Visit   Medication Sig Dispense Refill     aspirin (ASPIR-LOW) 81 MG EC tablet Take 1 tablet (81 mg total) by mouth daily. (Patient taking differently: Take 81 mg by mouth daily. Every other day      )       atorvastatin (LIPITOR) 40 MG tablet TAKE ONE TABLET BY MOUTH ONE TIME DAILY 90 tablet 3     glipiZIDE (GLUCOTROL) 5 MG tablet TAKE 1 TABLET BY MOUTH TWICE DAILY 1/2 HOUR BEFORE MEALS 180 tablet 10     lisinopril (PRINIVIL,ZESTRIL) 20 MG tablet TAKE 1 TABLET (20 MG TOTAL) BY MOUTH DAILY. 90 tablet 1     omeprazole (PRILOSEC) 20 MG capsule TAKE ONE CAPSULE BY MOUTH ONE TIME DAILY 90 capsule 3     ACCU-CHEK ANGELA PLUS METER Misc        ACCU-CHEK SOFTCLIX LANCETS lancets        blood glucose meter (GLUCOMETER) Use 1 each As Directed as needed. Dispense glucometer brand per patient's insurance at pharmacy discretion. 1 each 0     blood glucose test strips Use 1 each As Directed 2 (two) times a day. Dispense brand per patient's insurance at pharmacy discretion. 250 strip 12     generic lancets (MICROLET LANCET) Dispense brand per patient's insurance at pharmacy discretion. 200 each 11     glipiZIDE (GLUCOTROL) 5 MG tablet Take 5mg in the am and 7.5 mg in the pm 90 tablet 12     metFORMIN (GLUCOPHAGE-XR) 750 MG 24 hr tablet TAKE 2 TABLETS BY MOUTH ONCE DAILY WITH BREAKFAST (Patient taking differently: TAKE 1 TABLETS BY MOUTH ONCE DAILY WITH BREAKFAST) 180 tablet 3     omeprazole (PRILOSEC) 20 MG capsule TAKE ONE CAPSULE BY MOUTH ONE TIME DAILY 90 capsule 3     omeprazole (PRILOSEC) 20 MG capsule TAKE ONE CAPSULE BY MOUTH ONE TIME DAILY 90 capsule 2     sildenafil (VIAGRA) 100 MG tablet Take 1 tablet (100 mg total) by mouth as needed for  erectile dysfunction. 10 tablet 12     No current facility-administered medications on file prior to visit.           Recent Results (from the past 48 hour(s))   Glycosylated Hemoglobin A1c    Collection Time: 07/29/19  9:13 AM   Result Value Ref Range    Hemoglobin A1c 8.1 (H) 3.5 - 6.0 %   Lipid Cascade    Collection Time: 07/29/19  9:13 AM   Result Value Ref Range    Cholesterol 186 <=199 mg/dL    Triglycerides 197 (H) <=149 mg/dL    HDL Cholesterol 43 >=40 mg/dL    LDL Calculated 104 <=129 mg/dL    Patient Fasting > 8hrs? No    HIV Antigen/Antibody Screening Cascade    Collection Time: 07/29/19  9:13 AM   Result Value Ref Range    HIV Antigen / Antibody Negative Negative   Hepatitis C Antibody (Anti-HCV)    Collection Time: 07/29/19  9:13 AM   Result Value Ref Range    Hepatitis C Ab Negative Negative   Microalbumin, Random Urine    Collection Time: 07/29/19  9:22 AM   Result Value Ref Range    Microalbumin, Random Urine 2.62 (H) 0.00 - 1.99 mg/dL    Creatinine, Urine 246.3 mg/dL    Microalbumin/Creatinine Ratio Random Urine 10.6 <=19.9 mg/g        No images are attached to the encounter or orders placed in the encounter.       Assessment/Plan:  1. Type 2 diabetes mellitus (H)  Increase activity.  Increase glipizide to 7.5 mg in the evening and 7.5 mg in the a.m.  Monitor blood sugars for lows.  - Glycosylated Hemoglobin A1c  - Lipid Cascade  - Microalbumin, Random Urine    2. Actinic keratosis  After the risks and benefits were reviewed today, and the patient's permission was obtained.  These were frozen with 3 freeze thaw cycles.  Follow-up in 6 weeks for repeat check.    3. Mucoid cyst of joint  In the differential is also a giant cell tumor, versus a ganglion cyst.  I will refer to Ortho for further evaluation.    4. Encounter for hepatitis C screening test for low risk patient    - Hepatitis C Antibody (Anti-HCV)    5. Screening for HIV (human immunodeficiency virus)    - HIV Antigen/Antibody Screening  Cascade    6. It band syndrome, right  Stretches and exercises were given today.          Thelma Mendoza   7/30/2019 8:33 AM

## 2021-05-30 NOTE — TELEPHONE ENCOUNTER
Due for lab    Rx renewed per Medication Renewal Policy. Medication was last renewed on 6/14/18.    Janet Portillo, [facility] Triage/Med Refill 6/29/2019     Requested Prescriptions   Pending Prescriptions Disp Refills     glipiZIDE (GLUCOTROL) 5 MG tablet [Pharmacy Med Name: GLIPIZIDE 5 MG TABLET] 180 tablet 10     Sig: TAKE 1 TABLET BY MOUTH TWICE DAILY 1/2 HOUR BEFORE MEALS       Oral Hypoglycemics Refill Protocol Passed - 6/29/2019 12:37 AM        Passed - Visit with PCP or prescribing provider visit in last 6 months       Last office visit with prescriber/PCP: 2/21/2019 OR same dept: 2/21/2019 Thelma Mendoza MD OR same specialty: 2/21/2019 Thelma Mendoza MD Last physical: Visit date not found Last MTM visit: Visit date not found         Next appt within 3 mo: Visit date not found  Next physical within 3 mo: Visit date not found  Prescriber OR PCP: Thelma Mendoza MD  Last diagnosis associated with med order: 1. Diabetes mellitus (H)  - glipiZIDE (GLUCOTROL) 5 MG tablet [Pharmacy Med Name: GLIPIZIDE 5 MG TABLET]; TAKE 1 TABLET BY MOUTH TWICE DAILY 1/2 HOUR BEFORE MEALS  Dispense: 180 tablet; Refill: 10     If protocol passes may refill for 12 months if within 3 months of last provider visit (or a total of 15 months).           Passed - A1C in last 6 months     Hemoglobin A1c   Date Value Ref Range Status   02/21/2019 7.3 (H) 3.5 - 6.0 % Final               Passed - Microalbumin in last year      Microalbumin, Random Urine   Date Value Ref Range Status   07/16/2018 1.35 0.00 - 1.99 mg/dL Final                  Passed - Blood pressure in last year     BP Readings from Last 1 Encounters:   02/21/19 136/70             Passed - Serum creatinine in last year     Creatinine   Date Value Ref Range Status   11/19/2018 0.84 0.70 - 1.30 mg/dL Final

## 2021-05-30 NOTE — TELEPHONE ENCOUNTER
Refill Approved    Rx renewed per Medication Renewal Policy. Medication was last renewed on 7/24/18  #90 R-3.    Last OV 2/21/19    Iveth Macias, [facility] Triage/Med Refill 7/15/2019     Requested Prescriptions   Pending Prescriptions Disp Refills     omeprazole (PRILOSEC) 20 MG capsule [Pharmacy Med Name: OMEPRAZOLE DR 20 MG CAPSULE] 90 capsule 3     Sig: TAKE ONE CAPSULE BY MOUTH ONE TIME DAILY       GI Medications Refill Protocol Passed - 7/15/2019  1:21 AM        Passed - PCP or prescribing provider visit in last 12 or next 3 months.     Last office visit with prescriber/PCP: 2/21/2019 Thelma Mendoza MD OR same dept: 2/21/2019 Thelma Mendoza MD OR same specialty: 2/21/2019 Thelma Mendoza MD  Last physical: Visit date not found Last MTM visit: Visit date not found   Next visit within 3 mo: Visit date not found  Next physical within 3 mo: Visit date not found  Prescriber OR PCP: Thelma Mendoza MD  Last diagnosis associated with med order: There are no diagnoses linked to this encounter.  If protocol passes may refill for 12 months if within 3 months of last provider visit (or a total of 15 months).

## 2021-05-31 VITALS — HEIGHT: 66 IN | WEIGHT: 194.25 LBS | BODY MASS INDEX: 31.22 KG/M2

## 2021-05-31 VITALS — WEIGHT: 200 LBS | BODY MASS INDEX: 32.14 KG/M2 | HEIGHT: 66 IN

## 2021-05-31 VITALS — BODY MASS INDEX: 32.46 KG/M2 | WEIGHT: 198.1 LBS

## 2021-05-31 VITALS — BODY MASS INDEX: 30.53 KG/M2 | HEIGHT: 66 IN | WEIGHT: 190 LBS

## 2021-06-01 ENCOUNTER — RECORDS - HEALTHEAST (OUTPATIENT)
Dept: ADMINISTRATIVE | Facility: CLINIC | Age: 66
End: 2021-06-01

## 2021-06-01 VITALS — WEIGHT: 198 LBS | BODY MASS INDEX: 31.82 KG/M2 | HEIGHT: 66 IN

## 2021-06-01 VITALS — WEIGHT: 199 LBS | BODY MASS INDEX: 31.98 KG/M2 | HEIGHT: 66 IN

## 2021-06-01 NOTE — PROGRESS NOTES
Assessment:      Actinic Keratosis of forearm and forehead     Plan:      1.  Cryosurgery explained to the patient and then performed with Liquid Nitrogen applied unit to 7 lesions.  Post op course explained.  3. Continue sun protective measures and avoidance.  4. Observe closely for skin damage/changes and contact us if worrisome changes occur.  5. Verbal patient instruction given.  6. Follow up in 6 weeks.     Subjective:       Rene Guzman is a 64 y.o. male who presents for follow up of actinic keratosis. No new lesions have developed. Previous treatment for prior lesions has been cryosurgery.  Past history of skin cancer: actinic keratosis.  Other skin problems: no.    The following portions of the patient's history were reviewed and updated as appropriate: allergies, current medications, past family history, past medical history, past social history, past surgical history and problem list.    Review of Systems  Pertinent items are noted in HPI.      Objective:      Physical Exam   Skin: Raised erythematous scaly circumscribed area with gray/white keratotic scale present on the face, forearm bilaterally.  There were a total of 7 lesions that were frozen today.

## 2021-06-02 VITALS — HEIGHT: 66 IN | WEIGHT: 198 LBS | BODY MASS INDEX: 31.82 KG/M2

## 2021-06-02 VITALS — WEIGHT: 198 LBS | HEIGHT: 66 IN | BODY MASS INDEX: 31.82 KG/M2

## 2021-06-02 VITALS — BODY MASS INDEX: 33.22 KG/M2 | WEIGHT: 202.7 LBS

## 2021-06-02 VITALS — BODY MASS INDEX: 32.14 KG/M2 | WEIGHT: 200 LBS | HEIGHT: 66 IN

## 2021-06-03 VITALS — HEIGHT: 66 IN | BODY MASS INDEX: 31.5 KG/M2 | WEIGHT: 196 LBS

## 2021-06-03 VITALS
BODY MASS INDEX: 31.66 KG/M2 | OXYGEN SATURATION: 97 % | DIASTOLIC BLOOD PRESSURE: 84 MMHG | WEIGHT: 197 LBS | HEART RATE: 96 BPM | HEIGHT: 66 IN | SYSTOLIC BLOOD PRESSURE: 134 MMHG | TEMPERATURE: 97.9 F | RESPIRATION RATE: 20 BRPM

## 2021-06-03 VITALS
DIASTOLIC BLOOD PRESSURE: 74 MMHG | SYSTOLIC BLOOD PRESSURE: 136 MMHG | RESPIRATION RATE: 20 BRPM | HEART RATE: 91 BPM | OXYGEN SATURATION: 98 % | TEMPERATURE: 97.7 F | BODY MASS INDEX: 32.14 KG/M2 | WEIGHT: 200 LBS | HEIGHT: 66 IN

## 2021-06-03 NOTE — PROGRESS NOTES
Assessment:      Actinic Keratosis of forearm, face      Plan:      1.  Cryosurgery explained to the patient and then performed with Liquid Nitrogen to 3 lesions.  Post op course explained.  3. Continue sun protective measures and avoidance.  4. Observe closely for skin damage/changes and contact us if worrisome changes occur.  5. Verbal patient instruction given.  6. Follow up in january for type 2 diabetes and actinic keratosis.  .     Subjective:       Rene Guzman is a 64 y.o. male who presents for follow up of actinic keratosis. No new lesions have developed. Previous treatment for prior lesions has been cryosurgery.  Past history of skin cancer: actinic keratosis.  Other skin problems: no.    The following portions of the patient's history were reviewed and updated as appropriate: allergies, current medications, past family history, past medical history, past social history, past surgical history and problem list.    Review of Systems  Pertinent items are noted in HPI.      Objective:      Physical Exam   Skin: Raised erythematous scaly circumscribed area with gray/white keratotic scale present on the chest, scalp

## 2021-06-03 NOTE — TELEPHONE ENCOUNTER
Refill Approved    Rx renewed per Medication Renewal Policy. Medication was last renewed on 5/22/19.    Jayda Colmenares, [facility] Triage/Med Refill 11/12/2019     Requested Prescriptions   Pending Prescriptions Disp Refills     lisinopril (PRINIVIL,ZESTRIL) 20 MG tablet [Pharmacy Med Name: LISINOPRIL 20 MG TABLET] 90 tablet 1     Sig: TAKE 1 TABLET (20 MG TOTAL) BY MOUTH DAILY.       Ace Inhibitors Refill Protocol Passed - 11/12/2019  2:02 AM        Passed - PCP or prescribing provider visit in past 12 months       Last office visit with prescriber/PCP: 11/4/2019 Thelam Mendoza MD OR same dept: 11/4/2019 Thelma Mendoza MD OR same specialty: 11/4/2019 Thelma Mendoza MD  Last physical: Visit date not found Last MTM visit: Visit date not found   Next visit within 3 mo: Visit date not found  Next physical within 3 mo: Visit date not found  Prescriber OR PCP: Thelma Menodza MD  Last diagnosis associated with med order: 1. Diabetes mellitus (H)  - lisinopril (PRINIVIL,ZESTRIL) 20 MG tablet [Pharmacy Med Name: LISINOPRIL 20 MG TABLET]; TAKE 1 TABLET (20 MG TOTAL) BY MOUTH DAILY.  Dispense: 90 tablet; Refill: 1    If protocol passes may refill for 12 months if within 3 months of last provider visit (or a total of 15 months).             Passed - Serum Potassium in past 12 months     Lab Results   Component Value Date    Potassium 4.8 11/19/2018             Passed - Blood pressure filed in past 12 months     BP Readings from Last 1 Encounters:   11/04/19 136/74             Passed - Serum Creatinine in past 12 months     Creatinine   Date Value Ref Range Status   11/19/2018 0.84 0.70 - 1.30 mg/dL Final

## 2021-06-04 VITALS
HEIGHT: 65 IN | SYSTOLIC BLOOD PRESSURE: 138 MMHG | TEMPERATURE: 97.7 F | HEART RATE: 92 BPM | OXYGEN SATURATION: 98 % | DIASTOLIC BLOOD PRESSURE: 80 MMHG | BODY MASS INDEX: 33.15 KG/M2 | WEIGHT: 199 LBS

## 2021-06-04 VITALS
BODY MASS INDEX: 33.41 KG/M2 | DIASTOLIC BLOOD PRESSURE: 82 MMHG | WEIGHT: 200.5 LBS | TEMPERATURE: 98 F | HEART RATE: 80 BPM | OXYGEN SATURATION: 98 % | RESPIRATION RATE: 20 BRPM | HEIGHT: 65 IN | SYSTOLIC BLOOD PRESSURE: 164 MMHG

## 2021-06-04 VITALS
BODY MASS INDEX: 32.45 KG/M2 | HEART RATE: 88 BPM | WEIGHT: 194.75 LBS | DIASTOLIC BLOOD PRESSURE: 68 MMHG | RESPIRATION RATE: 16 BRPM | HEIGHT: 65 IN | OXYGEN SATURATION: 97 % | SYSTOLIC BLOOD PRESSURE: 112 MMHG | TEMPERATURE: 97.8 F

## 2021-06-05 VITALS
BODY MASS INDEX: 32.87 KG/M2 | WEIGHT: 197.5 LBS | OXYGEN SATURATION: 98 % | HEART RATE: 94 BPM | DIASTOLIC BLOOD PRESSURE: 72 MMHG | TEMPERATURE: 97.8 F | SYSTOLIC BLOOD PRESSURE: 124 MMHG

## 2021-06-05 VITALS
SYSTOLIC BLOOD PRESSURE: 138 MMHG | BODY MASS INDEX: 31.51 KG/M2 | HEART RATE: 96 BPM | DIASTOLIC BLOOD PRESSURE: 72 MMHG | HEIGHT: 65 IN | TEMPERATURE: 97.6 F | OXYGEN SATURATION: 97 % | WEIGHT: 189.12 LBS

## 2021-06-05 VITALS
SYSTOLIC BLOOD PRESSURE: 156 MMHG | DIASTOLIC BLOOD PRESSURE: 90 MMHG | HEART RATE: 86 BPM | BODY MASS INDEX: 32.99 KG/M2 | TEMPERATURE: 98 F | WEIGHT: 198 LBS | HEIGHT: 65 IN | OXYGEN SATURATION: 99 %

## 2021-06-06 ENCOUNTER — HEALTH MAINTENANCE LETTER (OUTPATIENT)
Age: 66
End: 2021-06-06

## 2021-06-06 NOTE — PATIENT INSTRUCTIONS - HE
Use wet to dry dressing.  Place a moist gauze over the wound and press it in.  Then place a dry gauze over with tape.  Change every 12 hours.

## 2021-06-06 NOTE — PROGRESS NOTES
"S:  Rene Guzman is a 64 y.o. male who comes to the clinic today for  1.  Hypertension: He has been taking 40 mg of lisinopril.  He denies any problems with this.  He has not yet gotten back to exercising on his treadmill though this is in his plan for his daily activity.  He has changed his diet to include many more vegetables and has decreased his other simple carbohydrates.  He denies any chest pain, shortness of breath  2.  Type 2 diabetes:  He has made major diet changes.  He is eating more vegetables.  His blood sugar numbers have been decreasing a little bit.      3.  Basal cell carcinoma.  He has had more pain over the last couple of days.He was seen by dermatology last week and had this area scraped.  Since that time it has started to cause some more pulling and tenderness.  He also noticed some drainage from the area.  He would like this looked at.  No fevers.      I reviewed the pertinent family, social, surgical, medical history.      O:  /80 (Patient Site: Left Arm, Patient Position: Sitting, Cuff Size: Adult Large)   Pulse 92   Temp 97.7  F (36.5  C) (Oral)   Ht 5' 5\" (1.651 m)   Wt 199 lb (90.3 kg)   SpO2 98%   BMI 33.12 kg/m    Gen:  In no acute distress, nontoxic,  Skin: The area over his back demonstrates a round 2 cm area with an erythematous raised indurated ring around it.  There is a large amount of proteinaceous material covering almost the entirety of the wound bed.  That has a purulent smell.  Some discharge is noted.  The area is tender.  The proteinaceous material was debrided today using a scalpel blade.  Some bleeding was noted at the wound bed.  A wet-to-dry was then placed over the area.  The patient was instructed in how to do wet-to-dry dressings at home.  Patient Active Problem List   Diagnosis     Heartburn     Acute Serous Otitis Media     Joint Pain, Localized In The Knee     Synovial Cyst Of Popliteal Space     Hypertension     Seborrheic Keratosis     Abdominal " Pain     Hyperlipidemia     Joint Pain, Localized In The Shoulder     Type 2 diabetes mellitus (H)     Tubular adenoma of colon     Basal cell carcinoma     Current Outpatient Medications on File Prior to Visit   Medication Sig Dispense Refill     ACCU-CHEK ANGELA PLUS METER Misc        ACCU-CHEK SOFTCLIX LANCETS lancets        aspirin (ASPIR-LOW) 81 MG EC tablet Take 1 tablet (81 mg total) by mouth daily. (Patient taking differently: Take 81 mg by mouth daily. Every other day      )       atorvastatin (LIPITOR) 40 MG tablet TAKE ONE TABLET BY MOUTH ONE TIME DAILY 90 tablet 3     blood glucose meter (GLUCOMETER) Use 1 each As Directed as needed. Dispense glucometer brand per patient's insurance at pharmacy discretion. 1 each 0     blood glucose test strips Use 1 each As Directed 2 (two) times a day. Dispense brand per patient's insurance at pharmacy discretion. 250 strip 12     generic lancets (MICROLET LANCET) Dispense brand per patient's insurance at pharmacy discretion. 200 each 11     glipiZIDE (GLUCOTROL) 5 MG tablet Take 5mg in the am and 10 mg in the pm 90 tablet 12     lisinopril (PRINIVIL,ZESTRIL) 40 MG tablet Take 1 tablet (40 mg total) by mouth daily. 90 tablet 3     metFORMIN (GLUCOPHAGE-XR) 750 MG 24 hr tablet TAKE 2 TABLETS BY MOUTH ONCE DAILY WITH BREAKFAST (Patient taking differently: TAKE 1 TABLETS BY MOUTH ONCE DAILY WITH BREAKFAST) 180 tablet 3     omeprazole (PRILOSEC) 20 MG capsule TAKE ONE CAPSULE BY MOUTH ONE TIME DAILY 90 capsule 3     omeprazole (PRILOSEC) 20 MG capsule TAKE ONE CAPSULE BY MOUTH ONE TIME DAILY 90 capsule 3     omeprazole (PRILOSEC) 20 MG capsule TAKE ONE CAPSULE BY MOUTH ONE TIME DAILY 90 capsule 2     sildenafil (VIAGRA) 100 MG tablet Take 1 tablet (100 mg total) by mouth as needed for erectile dysfunction. 10 tablet 12     No current facility-administered medications on file prior to visit.           No results found for this or any previous visit (from the past 48  hour(s)).     No images are attached to the encounter or orders placed in the encounter.       Assessment/Plan:  1. Cellulitis, unspecified cellulitis site, s/p debridement today.    Continue with wet-to-dry's twice daily.  Start cephalexin.  Return if any worsening symptoms.  He does have follow-up scheduled with the dermatologist.  - cephalexin (KEFLEX) 500 MG capsule; Take 1 capsule (500 mg total) by mouth 4 (four) times a day for 10 days.  Dispense: 40 capsule; Refill: 0      2.  Hypertension.  Continue with lisinopril at his current dose.  Go back to exercising as soon as possible.  Return in 2 months for recheck of blood pressure.     Thelma Mendoza   2/13/2020 4:12 PM

## 2021-06-06 NOTE — TELEPHONE ENCOUNTER
Refill Approved    Rx renewed per Medication Renewal Policy. Medication was last renewed on 2/23/19.    Janet Portillo, [facility] Triage/Med Refill 2/19/2020     Requested Prescriptions   Pending Prescriptions Disp Refills     atorvastatin (LIPITOR) 40 MG tablet [Pharmacy Med Name: ATORVASTATIN 40 MG TABLET] 90 tablet 3     Sig: TAKE ONE TABLET BY MOUTH ONE TIME DAILY       Statins Refill Protocol (Hmg CoA Reductase Inhibitors) Passed - 2/15/2020 12:28 AM        Passed - PCP or prescribing provider visit in past 12 months      Last office visit with prescriber/PCP: 2/13/2020 Thelma Mendoza MD OR same dept: 2/13/2020 Thelma Mendoza MD OR same specialty: 2/13/2020 Thelma Mendoza MD  Last physical: Visit date not found Last MTM visit: Visit date not found   Next visit within 3 mo: Visit date not found  Next physical within 3 mo: Visit date not found  Prescriber OR PCP: Thelma Mendoza MD  Last diagnosis associated with med order: 1. Diabetes mellitus (H)  - atorvastatin (LIPITOR) 40 MG tablet [Pharmacy Med Name: ATORVASTATIN 40 MG TABLET]; TAKE ONE TABLET BY MOUTH ONE TIME DAILY  Dispense: 90 tablet; Refill: 3    2. Hypercholesterolemia  - atorvastatin (LIPITOR) 40 MG tablet [Pharmacy Med Name: ATORVASTATIN 40 MG TABLET]; TAKE ONE TABLET BY MOUTH ONE TIME DAILY  Dispense: 90 tablet; Refill: 3    If protocol passes may refill for 12 months if within 3 months of last provider visit (or a total of 15 months).

## 2021-06-07 NOTE — PROGRESS NOTES
"Rene Guzman is a 65 y.o. male who is being evaluated via a billable telephone visit.      The patient has been notified of following:     \"This telephone visit will be conducted via a call between you and your physician/provider. We have found that certain health care needs can be provided without the need for a physical exam.  This service lets us provide the care you need with a short phone conversation.  If a prescription is necessary we can send it directly to your pharmacy.  If lab work is needed we can place an order for that and you can then stop by our lab to have the test done at a later time.    Telephone visits are billed at different rates depending on your insurance coverage. During this emergency period, for some insurers they may be billed the same as an in-person visit.  Please reach out to your insurance provider with any questions.    If during the course of the call the physician/provider feels a telephone visit is not appropriate, you will not be charged for this service.\"    Patient has given verbal consent to a Telephone visit? Yes    What phone number would you like to be contacted at?[phone]    Patient would like to receive their AVS by AVS Preference: Mail a copy.    S:  Rene Guzman is a 65 y.o. male who is being contacted via phone.    He just received notice at work that someone tested positive at work.  He has been wearing a mask.  No sx of covid 19  He is still going to work, but they are pushing social distancing there.    1.  Type 2 diabetes:  He has hada few highs in the am.  One am it was 160.  Most of the time his blood sugar is 140 in the am.    He has had some sx of lows when he is working during the day.  His diet has been up and down.  He has been trying to cut down his portions.  He has been able to get back on the treadmill.  That helps get his blood pressure down.    They eat at 5pm.  He might have some dessert after his meal.  He will have ice cream, cookies.  "   His weight remains the same.          2.  Hypertension:    He does have a cuff at home, but hasn't been checking it.    No cp, sobr, headaches.  He does have the treadmill elevated to 7, and is doing 3.0 miles per hour.  He denies any sobr with this . When he is on it, he elevates his arms a bit.  His endurance is improving the more he is on the treadmill .     I reviewed the pertinent family, social, surgical, medical history.      O:  There were no vitals taken for this visit.  Gen:  Nad.  Speech is fluent  No labored breathing.      Patient Active Problem List   Diagnosis     Heartburn     Acute Serous Otitis Media     Joint Pain, Localized In The Knee     Synovial Cyst Of Popliteal Space     Hypertension     Seborrheic Keratosis     Abdominal Pain     Hyperlipidemia     Joint Pain, Localized In The Shoulder     Type 2 diabetes mellitus (H)     Tubular adenoma of colon     Basal cell carcinoma     Current Outpatient Medications on File Prior to Visit   Medication Sig Dispense Refill     ACCU-CHEK ANGELA PLUS METER Misc        ACCU-CHEK SOFTCLIX LANCETS lancets        aspirin (ASPIR-LOW) 81 MG EC tablet Take 1 tablet (81 mg total) by mouth daily. (Patient taking differently: Take 81 mg by mouth daily. Every other day      )       atorvastatin (LIPITOR) 40 MG tablet TAKE ONE TABLET BY MOUTH ONE TIME DAILY 90 tablet 3     blood glucose meter (GLUCOMETER) Use 1 each As Directed as needed. Dispense glucometer brand per patient's insurance at pharmacy discretion. 1 each 0     blood glucose test strips Use 1 each As Directed 2 (two) times a day. Dispense brand per patient's insurance at pharmacy discretion. 250 strip 12     generic lancets (MICROLET LANCET) Dispense brand per patient's insurance at pharmacy discretion. 200 each 11     glipiZIDE (GLUCOTROL) 5 MG tablet Take 5mg in the am and 10 mg in the pm 90 tablet 12     lisinopril (PRINIVIL,ZESTRIL) 40 MG tablet Take 1 tablet (40 mg total) by mouth daily. 90 tablet  3     metFORMIN (GLUCOPHAGE-XR) 750 MG 24 hr tablet TAKE 2 TABLETS BY MOUTH ONCE DAILY WITH BREAKFAST (Patient taking differently: TAKE 1 TABLETS BY MOUTH ONCE DAILY WITH BREAKFAST) 180 tablet 3     omeprazole (PRILOSEC) 20 MG capsule TAKE ONE CAPSULE BY MOUTH ONE TIME DAILY 90 capsule 3     sildenafil (VIAGRA) 100 MG tablet Take 1 tablet (100 mg total) by mouth as needed for erectile dysfunction. 10 tablet 12     omeprazole (PRILOSEC) 20 MG capsule TAKE ONE CAPSULE BY MOUTH ONE TIME DAILY 90 capsule 3     omeprazole (PRILOSEC) 20 MG capsule TAKE ONE CAPSULE BY MOUTH ONE TIME DAILY 90 capsule 3     No current facility-administered medications on file prior to visit.           No results found for this or any previous visit (from the past 48 hour(s)).     No images are attached to the encounter or orders placed in the encounter.       Assessment/Plan:  1. Type 2 diabetes mellitus (H)  Poorly controlled.    Discussed taking out sweet bedtime snack.    Try a piece of fruit or nuts.    Try to get a1c and am blood sugar down  Recheck in July after these changes are made.  If worsening blood sugar control, will think about januvia or trulicity.  These were briefly discussed with the patient.    Encouraged ongoing diet nad exercise changes.    - metFORMIN (GLUCOPHAGE-XR) 750 MG 24 hr tablet; TAKE 1 TABLETS BY MOUTH ONCE DAILY WITH BREAKFAST  Dispense: 180 tablet; Refill: 3  - Glycosylated Hemoglobin A1c  - Comprehensive Metabolic Panel  - Comprehensive Metabolic Panel  - Lipid Cascade    2. Diabetes mellitus (H)    - metFORMIN (GLUCOPHAGE-XR) 750 MG 24 hr tablet; TAKE 1 TABLETS BY MOUTH ONCE DAILY WITH BREAKFAST  Dispense: 180 tablet; Refill: 3    3. Hypercholesterolemia  Future orders in.    - Lipid Cascade          Thelma Mendoza   5/4/2020 4:12 PM         Additional provider notes:         Phone call duration:  25 minutes    Shyann Colmenares, RADHA

## 2021-06-08 NOTE — PROGRESS NOTES
"S:  60 yo male who is here today for a recheck of diabetes.  He has not checked his diabetes numbers since he was here last.  He is doing a new job, which requires less movement.  The machine does more work at work than he has to.  He is walking for 25 minutes daily, he just got back to doing this.    He is working on his diet as much as he can.  He is looking forward to playing golf this summer.      O:    Visit Vitals     /80 (Patient Site: Left Arm, Patient Position: Sitting, Cuff Size: Adult Regular)     Pulse 88     Temp 98.1  F (36.7  C) (Oral)     Resp 16     Ht 5' 5.5\" (1.664 m)     Wt 196 lb (88.9 kg)     BMI 32.12 kg/m2     Gen: no acute distress  Neck:  Supple, no lad, no carotid bruits  Heart:  Regular rate and rhythm.  No m/r/g  Lungs: cta bilaterally, no wheezes or rhonchi.  Good air inspiration  Abdomen:  No masses or organomegaly  Extremities:  No edema.   Good hair growth over bilateral feet.  Ankle jerk reflexes present.  Normal DP pulses bilaterally.  Sensation intact over bilateral feet to pinprick.     Patient Active Problem List   Diagnosis     Heartburn     Acute Serous Otitis Media     Joint Pain, Localized In The Knee     Synovial Cyst Of Popliteal Space     Hypertension     Seborrheic Keratosis     Abdominal Pain     Hyperlipidemia     Joint Pain, Localized In The Shoulder     Type 2 Diabetes Mellitus     Tubular adenoma of colon     Current Outpatient Prescriptions on File Prior to Visit   Medication Sig Dispense Refill     ACCU-CHEK ANGELA PLUS METER Misc        ACCU-CHEK SOFTCLIX LANCETS lancets        aspirin (ASPIR-LOW) 81 MG EC tablet Take 1 tablet (81 mg total) by mouth daily.       atorvastatin (LIPITOR) 40 MG tablet TAKE ONE TABLET BY MOUTH ONE TIME DAILY 90 tablet 4     blood sugar diagnostic (GLUCOSE BLOOD) Strp Test 1 times daily. Theo Contour Test In Vitro Strip. 300 strip 4     lisinopril (PRINIVIL,ZESTRIL) 2.5 MG tablet TAKE ONE TABLET BY MOUTH ONE TIME DAILY 90 tablet " 4     metFORMIN (GLUCOPHAGE XR) 500 MG 24 hr tablet Take 2 tablets (1,000 mg total) by mouth daily with breakfast. 180 tablet 4     omeprazole (PRILOSEC) 20 MG capsule Take 1 capsule (20 mg total) by mouth daily. 90 capsule 4     No current facility-administered medications on file prior to visit.           Recent Results (from the past 48 hour(s))   Glycosylated Hemoglobin A1c    Collection Time: 02/02/17  5:02 PM   Result Value Ref Range    Hemoglobin A1c 8.3 (H) 3.5 - 6.0 %   Comprehensive Metabolic Panel    Collection Time: 02/02/17  5:02 PM   Result Value Ref Range    Sodium 139 136 - 145 mmol/L    Potassium 4.1 3.5 - 5.0 mmol/L    Chloride 104 98 - 107 mmol/L    CO2 25 22 - 31 mmol/L    Anion Gap, Calculation 10 5 - 18 mmol/L    Glucose 138 (H) 70 - 125 mg/dL    BUN 16 8 - 22 mg/dL    Creatinine 1.03 0.70 - 1.30 mg/dL    GFR MDRD Af Amer >60 >60 mL/min/1.73m2    GFR MDRD Non Af Amer >60 >60 mL/min/1.73m2    Bilirubin, Total 0.6 0.0 - 1.0 mg/dL    Calcium 9.9 8.5 - 10.5 mg/dL    Protein, Total 6.9 6.0 - 8.0 g/dL    Albumin 4.2 3.5 - 5.0 g/dL    Alkaline Phosphatase 76 45 - 120 U/L    AST 16 0 - 40 U/L    ALT 28 0 - 45 U/L   Microalbumin, Random Urine    Collection Time: 02/02/17  5:15 PM   Result Value Ref Range    Microalbumin, Random Urine 4.13 (H) 0.00 - 1.99 mg/dL    Creatinine, Urine 226.3 mg/dL    Microalbumin/Creatinine Ratio Random Urine 18.3 <=19.9 mg/g         Assessment/Plan:  1. Adenomatous polyp      2. Diabetes mellitus  If a1c is high, then start glipizide.    Pt states he will cut out all etoh.  Is currently having several vodka shots nightly.    Encourage ongoing exercise and healthy diet.    - Glycosylated Hemoglobin A1c  - Comprehensive Metabolic Panel  - Microalbumin, Random Urine    3. Essential hypertension  Continue with current meds.   Monitor bp, if increasing will increase dose of lisinopril.      4. Hypercholesterolemia  Continue current meds.  Check cmp.            Thelma Santa  Kelly   2/3/2017 4:41 PM

## 2021-06-09 NOTE — PROGRESS NOTES
"S:  Rene Guzman is a 65 y.o. male who comes to the clinic today for  1.  Type 2 diabetes:  He has occasional highs.  He gets on the treadmill 3x weekly.  He does extra stairs at home . His numbers have been in the 160's.  He has also gotten 110.  He has more 150's and 140's than lower in the am.  He has not had any lows.  He is doing ok with his metformin.  It is causing some loose stools in the am ,and then it is all done.  He is doing 5mg glipzide in the am and 10mg in the pm.    No cp. No sobr.  No n/t/w in any one part of his body.      I reviewed the pertinent family, social, surgical, medical history.    Soc hx:  His wife's mother  on Thursday.  She  of lymphoma.  The  is Wednesday .   It will be socially distanced.  Masks will be required.      O:  /68 (Patient Site: Left Arm, Patient Position: Sitting, Cuff Size: Adult Regular)   Pulse 88   Temp 97.8  F (36.6  C) (Oral)   Resp 16   Ht 5' 5\" (1.651 m)   Wt 194 lb 12 oz (88.3 kg)   SpO2 97%   BMI 32.41 kg/m     Gen: no acute distress  Neck:  Supple, no lad, no carotid bruits  Heart:  Regular rate and rhythm.  No m/r/g  Lungs: cta bilaterally, no wheezes or rhonchi.  Good air inspiration  Abdomen:  No masses or organomegaly  Extremities:  No edema.     2 point sensation is normal on feet.  Sensation intact to monofilament.    Wart noted on right foot.    Skin:  Large, ovoid area with heaped up borders on back that extends 2x3cm.  Non tender.          Patient Active Problem List   Diagnosis     Heartburn     Acute Serous Otitis Media     Joint Pain, Localized In The Knee     Synovial Cyst Of Popliteal Space     Hypertension     Seborrheic Keratosis     Abdominal Pain     Hyperlipidemia     Joint Pain, Localized In The Shoulder     Type 2 diabetes mellitus (H)     Tubular adenoma of colon     Basal cell carcinoma     Current Outpatient Medications on File Prior to Visit   Medication Sig Dispense Refill     ACCU-CHEK ANGELA PLUS " METER Misc        ACCU-CHEK SOFTCLIX LANCETS lancets        aspirin (ASPIR-LOW) 81 MG EC tablet Take 1 tablet (81 mg total) by mouth daily. (Patient taking differently: Take 81 mg by mouth daily. Every other day      )       atorvastatin (LIPITOR) 40 MG tablet TAKE ONE TABLET BY MOUTH ONE TIME DAILY 90 tablet 3     blood glucose meter (GLUCOMETER) Use 1 each As Directed as needed. Dispense glucometer brand per patient's insurance at pharmacy discretion. 1 each 0     blood glucose test strips Use 1 each As Directed 2 (two) times a day. Dispense brand per patient's insurance at pharmacy discretion. 250 strip 12     generic lancets (MICROLET LANCET) Dispense brand per patient's insurance at pharmacy discretion. 200 each 11     glipiZIDE (GLUCOTROL) 5 MG tablet Take 5mg in the am and 10 mg in the pm 90 tablet 12     lisinopril (PRINIVIL,ZESTRIL) 40 MG tablet Take 1 tablet (40 mg total) by mouth daily. 90 tablet 3     metFORMIN (GLUCOPHAGE-XR) 750 MG 24 hr tablet TAKE 1 TABLETS BY MOUTH ONCE DAILY WITH BREAKFAST 180 tablet 3     sildenafil (VIAGRA) 100 MG tablet Take 1 tablet (100 mg total) by mouth as needed for erectile dysfunction. 10 tablet 12     No current facility-administered medications on file prior to visit.           No results found for this or any previous visit (from the past 48 hour(s)).     No images are attached to the encounter or orders placed in the encounter.       Assessment/Plan:  1. Type 2 diabetes mellitus with hyperglycemia, without long-term current use of insulin (H)  Increase metformin.   Encouraged ongoing healthy diet and increased exercise.     2. Hypercholesterolemia      3. Type 2 diabetes mellitus (H)    - metFORMIN (GLUCOPHAGE-XR) 500 MG 24 hr tablet; Take 2 tablets (1,000 mg total) by mouth daily with breakfast.  Dispense: 180 tablet; Refill: 3    4. Diabetes mellitus (H)    - metFORMIN (GLUCOPHAGE-XR) 500 MG 24 hr tablet; Take 2 tablets (1,000 mg total) by mouth daily with  breakfast.  Dispense: 180 tablet; Refill: 3    5. Viral warts, unspecified type  After risks and benefits were discussed and verbal consent was given, the area was frozen on the plantar aspect of the right foot x 3.    Pt was instructed in aftercare.  Return as needed.  Advised to use nail file over the area.      Advised to follow up with dermatology.        Thelma Mendoza   7/13/2020 3:36 PM

## 2021-06-10 NOTE — TELEPHONE ENCOUNTER
RN cannot approve Refill Request    RN can NOT refill this medication PCP messaged that patient is overdue for Labs. Last office visit: 7/13/2020 Thelma Mendoza MD Last Physical: Visit date not found Last MTM visit: Visit date not found Last visit same specialty: 7/13/2020 Thelma Mendoza MD.  Next visit within 3 mo: Visit date not found  Next physical within 3 mo: Visit date not found      Megan Desai, [facility] Triage/Med Refill 8/17/2020    Requested Prescriptions   Pending Prescriptions Disp Refills     glipiZIDE (GLUCOTROL) 5 MG tablet [Pharmacy Med Name: GLIPIZIDE 5 MG TABLET] 180 tablet 10     Sig: TAKE 1 TABLET BY MOUTH TWICE DAILY 1/2 HOUR BEFORE MEALS       Oral Hypoglycemics Refill Protocol Failed - 8/15/2020  3:14 PM        Failed - Microalbumin in last year      Microalbumin, Random Urine   Date Value Ref Range Status   07/29/2019 2.62 (H) 0.00 - 1.99 mg/dL Final                  Passed - Visit with PCP or prescribing provider visit in last 6 months       Last office visit with prescriber/PCP: 7/13/2020 OR same dept: 7/13/2020 Thelma Mendoza MD OR same specialty: 7/13/2020 Thelma Mendoza MD Last physical: Visit date not found Last MTM visit: Visit date not found         Next appt within 3 mo: Visit date not found  Next physical within 3 mo: Visit date not found  Prescriber OR PCP: Thelma Mendoza MD  Last diagnosis associated with med order: 1. Diabetes mellitus (H)  - glipiZIDE (GLUCOTROL) 5 MG tablet [Pharmacy Med Name: GLIPIZIDE 5 MG TABLET]; TAKE 1 TABLET BY MOUTH TWICE DAILY 1/2 HOUR BEFORE MEALS  Dispense: 180 tablet; Refill: 10     If protocol passes may refill for 12 months if within 3 months of last provider visit (or a total of 15 months).           Passed - A1C in last 6 months     Hemoglobin A1c   Date Value Ref Range Status   07/13/2020 8.2 (H) 3.5 - 6.0 % Final               Passed - Blood pressure in last year     BP Readings from Last 1  Encounters:   07/13/20 112/68             Passed - Serum creatinine in last year     Creatinine   Date Value Ref Range Status   07/13/2020 1.08 0.70 - 1.30 mg/dL Final

## 2021-06-11 NOTE — PROGRESS NOTES
"S:  63 yo male who is here for a check of his diabetes.  He drinks 1 o douls daily.  He is still working.  No cp, or sobr.  He is on the treadmill 20 minutes daily.  He did cut himself today.  Needs tdap.    No vision changes.  No headaches.  No n/t/w in any part of his body.    fam hx unchanged.    O:  /74  Pulse 88  Temp 98.4  F (36.9  C) (Oral)   Resp 16  Ht 5' 5.5\" (1.664 m)  Wt 190 lb (86.2 kg)  BMI 31.14 kg/m2  Gen: no acute distress  Neck:  Supple, no lad, no carotid bruits  Heart:  Regular rate and rhythm.  No m/r/g  Lungs: cta bilaterally, no wheezes or rhonchi.  Good air inspiration  Abdomen:  No masses or organomegaly  Extremities:  No edema.   Sensation to monofilament is intact over bilateral feet.  Feet have good hair growth.  Pulses are normal. 1.5cm laceration over left palm.  Sensation and strength intact in left thumb.  Some oozing is noted.  No foreign bodies noted.  He cut it on a machine at work.      Patient Active Problem List   Diagnosis     Heartburn     Acute Serous Otitis Media     Joint Pain, Localized In The Knee     Synovial Cyst Of Popliteal Space     Hypertension     Seborrheic Keratosis     Abdominal Pain     Hyperlipidemia     Joint Pain, Localized In The Shoulder     Type 2 diabetes mellitus     Tubular adenoma of colon     Current Outpatient Prescriptions on File Prior to Visit   Medication Sig Dispense Refill     ACCU-CHEK ANGELA PLUS METER Misc        ACCU-CHEK SOFTCLIX LANCETS lancets        aspirin (ASPIR-LOW) 81 MG EC tablet Take 1 tablet (81 mg total) by mouth daily.       atorvastatin (LIPITOR) 40 MG tablet TAKE ONE TABLET BY MOUTH ONE TIME DAILY 90 tablet 4     lisinopril (PRINIVIL,ZESTRIL) 2.5 MG tablet TAKE ONE TABLET BY MOUTH ONE TIME DAILY 90 tablet 4     [DISCONTINUED] blood sugar diagnostic (GLUCOSE BLOOD) Strp Test 1 times daily. Theo Contour Test In Vitro Strip. 300 strip 4     [DISCONTINUED] metFORMIN (GLUCOPHAGE-XR) 500 MG 24 hr tablet TAKE 2 TABLETS " BY MOUTH DAILY WITH BREAKFAST. 180 tablet 0     [DISCONTINUED] omeprazole (PRILOSEC) 20 MG capsule TAKE ONE CAPSULE BY MOUTH ONE TIME DAILY 90 capsule 3     No current facility-administered medications on file prior to visit.           Recent Results (from the past 48 hour(s))   Glycosylated Hemoglobin A1c    Collection Time: 06/02/17  4:30 PM   Result Value Ref Range    Hemoglobin A1c 10.1 (H) 3.5 - 6.0 %         Assessment/Plan:  1. GERD (gastroesophageal reflux disease)    - omeprazole (PRILOSEC) 20 MG capsule; TAKE ONE CAPSULE BY MOUTH ONE TIME DAILY  Dispense: 90 capsule; Refill: 3    2. Laceration  After the patient's consent was obtained the area was cleansed with normal saline.  The wound edges were approximated with pressure and Dermabond was applied.  Once this had dried Steri-Strips were placed over the top.  He was instructed in wound care.    3. Diabetes mellitus  The elevated A1c was today discussed at today's clinic.  We will start glipizide 5 mg p.o. twice daily.  He will start checking his blood sugars regularly.  He will notify me if he has elevated a.m. blood sugars or elevated postpartum blood sugars.  Can consider increasing glipizide at this time.  Increase metformin to 1500 mg p.o. Daily.  continue with exercise.  I encouraged him to consider more stringent diet changes.  Recheck in 3 months.    - Glycosylated Hemoglobin A1c  - Comprehensive Metabolic Panel  - blood glucose meter (GLUCOMETER); Use 1 each As Directed as needed. Dispense glucometer brand per patient's insurance at pharmacy discretion.  Dispense: 1 each; Refill: 0  - generic lancets (MICROLET LANCET); Dispense brand per patient's insurance at pharmacy discretion.  Dispense: 200 each; Refill: 11  - blood glucose test strips; Use 1 each As Directed as needed. Dispense brand per patient's insurance at pharmacy discretion.  Dispense: 250 each; Refill: 11  - metFORMIN (GLUCOPHAGE-XR) 750 MG 24 hr tablet; Take 2 tablets (1,500 mg  total) by mouth daily with breakfast.  Dispense: 60 tablet; Refill: 12  - glipiZIDE (GLUCOTROL) 5 MG tablet; Take 1 tablet (5 mg total) by mouth 2 (two) times a day before meals. 1/2 Hour BEFORE meals  Dispense: 60 tablet; Refill: 12    4. Essential hypertension    - Comprehensive Metabolic Panel    5. Hypercholesterolemia    - Comprehensive Metabolic Panel    6. Type 2 diabetes mellitus    - metFORMIN (GLUCOPHAGE-XR) 750 MG 24 hr tablet; Take 2 tablets (1,500 mg total) by mouth daily with breakfast.  Dispense: 60 tablet; Refill: 12      tdap given today.     Thelma Mendoza   6/2/2017 4:18 PM

## 2021-06-12 NOTE — TELEPHONE ENCOUNTER
RN cannot approve Refill Request    RN can NOT refill this medication PCP messaged that patient is overdue for Labs. Last office visit: 7/13/2020 Thelma Mendoza MD Last Physical: Visit date not found Last MTM visit: Visit date not found Last visit same specialty: 7/13/2020 Thelma Mendoza MD.  Next visit within 3 mo: Visit date not found  Next physical within 3 mo: Visit date not found      Megan Desai, [facility] Triage/Med Refill 10/3/2020    Requested Prescriptions   Pending Prescriptions Disp Refills     lisinopriL (PRINIVIL,ZESTRIL) 20 MG tablet [Pharmacy Med Name: LISINOPRIL 20 MG TABLET] 90 tablet 0     Sig: TAKE 1 TABLET (20 MG TOTAL) BY MOUTH DAILY.       Ace Inhibitors Refill Protocol Passed - 9/30/2020 11:21 AM        Passed - PCP or prescribing provider visit in past 12 months       Last office visit with prescriber/PCP: 7/13/2020 Thelma Mendoza MD OR same dept: 7/13/2020 Thelma Mendoza MD OR same specialty: 7/13/2020 Thelma Mendoza MD  Last physical: Visit date not found Last MTM visit: Visit date not found   Next visit within 3 mo: Visit date not found  Next physical within 3 mo: Visit date not found  Prescriber OR PCP: Thelma Mendoza MD  Last diagnosis associated with med order: 1. Diabetes mellitus (H)  - lisinopriL (PRINIVIL,ZESTRIL) 20 MG tablet [Pharmacy Med Name: LISINOPRIL 20 MG TABLET]; TAKE 1 TABLET (20 MG TOTAL) BY MOUTH DAILY.  Dispense: 90 tablet; Refill: 0  - metFORMIN (GLUCOPHAGE-XR) 750 MG 24 hr tablet [Pharmacy Med Name: METFORMIN HCL  MG TABLET]; TAKE 2 TABLETS BY MOUTH ONCE DAILY WITH BREAKFAST  Dispense: 180 tablet; Refill: 3    2. Type 2 diabetes mellitus (H)  - metFORMIN (GLUCOPHAGE-XR) 750 MG 24 hr tablet [Pharmacy Med Name: METFORMIN HCL  MG TABLET]; TAKE 2 TABLETS BY MOUTH ONCE DAILY WITH BREAKFAST  Dispense: 180 tablet; Refill: 3    If protocol passes may refill for 12 months if within 3 months of last provider visit  (or a total of 15 months).             Passed - Serum Potassium in past 12 months     Lab Results   Component Value Date    Potassium 4.4 07/13/2020             Passed - Blood pressure filed in past 12 months     BP Readings from Last 1 Encounters:   07/13/20 112/68             Passed - Serum Creatinine in past 12 months     Creatinine   Date Value Ref Range Status   07/13/2020 1.08 0.70 - 1.30 mg/dL Final                metFORMIN (GLUCOPHAGE-XR) 750 MG 24 hr tablet [Pharmacy Med Name: METFORMIN HCL  MG TABLET] 180 tablet 3     Sig: TAKE 2 TABLETS BY MOUTH ONCE DAILY WITH BREAKFAST       Metformin Refill Protocol Failed - 9/30/2020 11:21 AM        Failed - Microalbumin in last year      Microalbumin, Random Urine   Date Value Ref Range Status   07/29/2019 2.62 (H) 0.00 - 1.99 mg/dL Final                  Passed - Blood pressure in last 12 months     BP Readings from Last 1 Encounters:   07/13/20 112/68             Passed - LFT or AST or ALT in last 12 months     Albumin   Date Value Ref Range Status   07/13/2020 4.2 3.5 - 5.0 g/dL Final     Bilirubin, Total   Date Value Ref Range Status   07/13/2020 0.5 0.0 - 1.0 mg/dL Final     Bilirubin, Direct   Date Value Ref Range Status   08/05/2011 0.17 <0.31 mg/dL Final     Alkaline Phosphatase   Date Value Ref Range Status   07/13/2020 79 45 - 120 U/L Final     AST   Date Value Ref Range Status   07/13/2020 13 0 - 40 U/L Final     ALT   Date Value Ref Range Status   07/13/2020 23 0 - 45 U/L Final     Protein, Total   Date Value Ref Range Status   07/13/2020 7.1 6.0 - 8.0 g/dL Final                Passed - GFR or Serum Creatinine in last 6 months     GFR MDRD Non Af Amer   Date Value Ref Range Status   07/13/2020 >60 >60 mL/min/1.73m2 Final     GFR MDRD Af Amer   Date Value Ref Range Status   07/13/2020 >60 >60 mL/min/1.73m2 Final             Passed - Visit with PCP or prescribing provider visit in last 6 months or next 3 months     Last office visit with  prescriber/PCP: 7/13/2020 OR same dept: 7/13/2020 Thelma Mendoza MD OR same specialty: 7/13/2020 Thelma Mendoza MD Last physical: Visit date not found Last MTM visit: Visit date not found         Next appt within 3 mo: Visit date not found  Next physical within 3 mo: Visit date not found  Prescriber OR PCP: Thelma Mendoza MD  Last diagnosis associated with med order: 1. Diabetes mellitus (H)  - lisinopriL (PRINIVIL,ZESTRIL) 20 MG tablet [Pharmacy Med Name: LISINOPRIL 20 MG TABLET]; TAKE 1 TABLET (20 MG TOTAL) BY MOUTH DAILY.  Dispense: 90 tablet; Refill: 0  - metFORMIN (GLUCOPHAGE-XR) 750 MG 24 hr tablet [Pharmacy Med Name: METFORMIN HCL  MG TABLET]; TAKE 2 TABLETS BY MOUTH ONCE DAILY WITH BREAKFAST  Dispense: 180 tablet; Refill: 3    2. Type 2 diabetes mellitus (H)  - metFORMIN (GLUCOPHAGE-XR) 750 MG 24 hr tablet [Pharmacy Med Name: METFORMIN HCL  MG TABLET]; TAKE 2 TABLETS BY MOUTH ONCE DAILY WITH BREAKFAST  Dispense: 180 tablet; Refill: 3     If protocol passes may refill for 12 months if within 3 months of last provider visit (or a total of 15 months).           Passed - A1C in last 6 months     Hemoglobin A1c   Date Value Ref Range Status   07/13/2020 8.2 (H) 3.5 - 6.0 % Final

## 2021-06-13 NOTE — TELEPHONE ENCOUNTER
Refill Approved    Rx renewed per Medication Renewal Policy. Medication was last renewed on 11/4/19.    Janet Portillo, [facility] Triage/Med Refill 11/30/2020     Requested Prescriptions   Pending Prescriptions Disp Refills     sildenafiL (VIAGRA) 100 MG tablet [Pharmacy Med Name: SILDENAFIL 100 MG TABLET] 12 tablet 10     Sig: TAKE 1 TABLET BY MOUTH EVERY DAY AS NEEDED FOR ERECTILE DYSFUNCTION       Medications for Impotence Refill Protocol Passed - 11/29/2020  1:41 PM        Passed - PCP or prescribing provider visit in last year     Last office visit with prescriber/PCP: 7/13/2020 Thelma Mendoza MD OR same dept: 7/13/2020 Thelma Mendoza MD OR same specialty: 7/13/2020 Thelma Mendoza MD  Last physical: Visit date not found Last MTM visit: Visit date not found   Next visit within 3 mo: Visit date not found  Next physical within 3 mo: Visit date not found  Prescriber OR PCP: Thelma Mendoza MD  Last diagnosis associated with med order: 1. Erectile dysfunction, unspecified erectile dysfunction type  - sildenafiL (VIAGRA) 100 MG tablet [Pharmacy Med Name: SILDENAFIL 100 MG TABLET]; TAKE 1 TABLET BY MOUTH EVERY DAY AS NEEDED FOR ERECTILE DYSFUNCTION  Dispense: 12 tablet; Refill: 10    If protocol passes may refill for 12 months if within 3 months of last provider visit (or a total of 15 months).

## 2021-06-13 NOTE — PROGRESS NOTES
"Rene Guzman is a 65 y.o. male who is being evaluated via a billable telephone visit.      The patient has been notified of following:     \"This telephone visit will be conducted via a call between you and your physician/provider. We have found that certain health care needs can be provided without the need for a physical exam.  This service lets us provide the care you need with a short phone conversation.  If a prescription is necessary we can send it directly to your pharmacy.  If lab work is needed we can place an order for that and you can then stop by our lab to have the test done at a later time.    Telephone visits are billed at different rates depending on your insurance coverage. During this emergency period, for some insurers they may be billed the same as an in-person visit.  Please reach out to your insurance provider with any questions.    If during the course of the call the physician/provider feels a telephone visit is not appropriate, you will not be charged for this service.\"    Patient has given verbal consent to a Telephone visit? Yes    What phone number would you like to be contacted at? [phone]    Patient would like to receive their AVS by AVS Preference: Mail a copy.  S:  Rene Guzman is a 65 y.o. male who joins a virtual visit for   Chief Complaint   Patient presents with     Diabetes    am BS if he doesn't eat well his blood sugar in the am is 160.  If he eats well, his bs is 120.    He can't handle the 750mg metformin, 2 tablets in the am.  He had too much diarrhea with the 2 tablets, so he went back to 750mg.    He has been doing glipizide 2 tablets in the pm and 1 in the am.  He has been feeling good . He is getting on his treadmill once in a while, but not often when he is at work all day.    His brother in law just got covid.    They are going to be very careful of covid during thanksgiving.    I reviewed the pertinent family, social, surgical, medical history.    Ros:  No " cp, no sobr.  He feels really good .   No new n/t/w in any part of his body . No vision problems.      O:  There were no vitals taken for this visit.  Gen:  nad  No labored breathing.      Patient Active Problem List   Diagnosis     Heartburn     Acute Serous Otitis Media     Joint Pain, Localized In The Knee     Synovial Cyst Of Popliteal Space     Hypertension     Seborrheic Keratosis     Abdominal Pain     Hyperlipidemia     Joint Pain, Localized In The Shoulder     Type 2 diabetes mellitus (H)     Tubular adenoma of colon     Basal cell carcinoma     Current Outpatient Medications on File Prior to Visit   Medication Sig Dispense Refill     ACCU-CHEK ANGELA PLUS METER Misc        ACCU-CHEK SOFTCLIX LANCETS lancets        aspirin (ASPIR-LOW) 81 MG EC tablet Take 1 tablet (81 mg total) by mouth daily. (Patient taking differently: Take 81 mg by mouth daily. Every other day      )       atorvastatin (LIPITOR) 40 MG tablet TAKE ONE TABLET BY MOUTH ONE TIME DAILY 90 tablet 3     blood glucose meter (GLUCOMETER) Use 1 each As Directed as needed. Dispense glucometer brand per patient's insurance at pharmacy discretion. 1 each 0     blood glucose test strips Use 1 each As Directed 2 (two) times a day. Dispense brand per patient's insurance at pharmacy discretion. 250 strip 12     generic lancets (MICROLET LANCET) Dispense brand per patient's insurance at pharmacy discretion. 200 each 11     glipiZIDE (GLUCOTROL) 5 MG tablet TAKE 1 TABLET BY MOUTH TWICE DAILY 1/2 HOUR BEFORE MEALS 180 tablet 10     lisinopril (PRINIVIL,ZESTRIL) 40 MG tablet Take 1 tablet (40 mg total) by mouth daily. 90 tablet 3     metFORMIN (GLUCOPHAGE-XR) 750 MG 24 hr tablet TAKE 2 TABLETS BY MOUTH ONCE DAILY WITH BREAKFAST 180 tablet 3     lisinopriL (PRINIVIL,ZESTRIL) 20 MG tablet TAKE 1 TABLET (20 MG TOTAL) BY MOUTH DAILY. 90 tablet 4     metFORMIN (GLUCOPHAGE-XR) 500 MG 24 hr tablet Take 2 tablets (1,000 mg total) by mouth daily with breakfast. 180  tablet 3     sildenafil (VIAGRA) 100 MG tablet Take 1 tablet (100 mg total) by mouth as needed for erectile dysfunction. 10 tablet 12     No current facility-administered medications on file prior to visit.           No results found for this or any previous visit (from the past 48 hour(s)).     No images are attached to the encounter or orders placed in the encounter.       Assessment/Plan:  1. Type 2 diabetes mellitus with hyperglycemia, without long-term current use of insulin (H)  Will increase glipizide to 7.5mg for 1 week, if this is ok, then will go to 10mg po two times a day.  If no low blood sugars, and still having high blood sugars in the am, will then start januvia.    Recheck in 3 months.    - SITagliptin (JANUVIA) 50 MG tablet; Take 1 tablet (50 mg total) by mouth daily. With or without food  Dispense: 90 tablet; Refill: 4    2.  Blood pressure:    He has not been checking his numbers, but will start.        Thelma Mendoza   11/13/2020 4:07 PM       Phone call duration:  16 minutes    Susan Tyler MA

## 2021-06-13 NOTE — PROGRESS NOTES
"S:  61yo male who is here in follow up of his diabetes.  He has greatly decreased his alcohol intake.  He is drinking 1 odouls daily.  He has had a few lows, which he felt.  He has changed his diet a bit.  He is eating out less.  When he eats out, it doesn't taste as good.    He has some mild diarrhea daily.  No blood in his stools.  This is not a change from what he has done for a long time.    He has not been walking as regularly.  [place]    No n/t in his feet.    He has one spot on his head that gets caught on his comb.      Soc hx;  He would like to retire in 4 years if possible.      O:  /80  Pulse 66  Temp 98.1  F (36.7  C) (Oral)   Resp 18  Ht 5' 5.5\" (1.664 m)  Wt 194 lb 4 oz (88.1 kg)  BMI 31.83 kg/m2  Gen: no acute distress  Neck:  Supple, no lad, no carotid bruits  Heart:  Regular rate and rhythm.  No m/r/g  Lungs: cta bilaterally, no wheezes or rhonchi.  Good air inspiration  Abdomen:  No masses or organomegaly  Extremities:  No edema.   Feet have good hair growth.  Sensation is normal to monofilament.  DP pulses are normal bilaterally  Skin:  Small, less than 1 cm area over left scalp just posterior to the forehead that is slightly brown in color.  Very minimally raised edges.  Area of scab over the middle of this.  Wart over left 2nd finger.          Patient Active Problem List   Diagnosis     Heartburn     Acute Serous Otitis Media     Joint Pain, Localized In The Knee     Synovial Cyst Of Popliteal Space     Hypertension     Seborrheic Keratosis     Abdominal Pain     Hyperlipidemia     Joint Pain, Localized In The Shoulder     Type 2 diabetes mellitus     Tubular adenoma of colon     Current Outpatient Prescriptions on File Prior to Visit   Medication Sig Dispense Refill     ACCU-CHEK ANGELA PLUS METER Misc        ACCU-CHEK SOFTCLIX LANCETS lancets        aspirin (ASPIR-LOW) 81 MG EC tablet Take 1 tablet (81 mg total) by mouth daily.       atorvastatin " (LIPITOR) 40 MG tablet TAKE ONE TABLET BY MOUTH ONE TIME DAILY 90 tablet 4     blood glucose meter (GLUCOMETER) Use 1 each As Directed as needed. Dispense glucometer brand per patient's insurance at pharmacy discretion. 1 each 0     blood glucose test strips Use 1 each As Directed as needed. Dispense brand per patient's insurance at pharmacy discretion. 250 each 11     generic lancets (MICROLET LANCET) Dispense brand per patient's insurance at pharmacy discretion. 200 each 11     glipiZIDE (GLUCOTROL) 5 MG tablet Take 1 tablet (5 mg total) by mouth 2 (two) times a day before meals. 1/2 Hour BEFORE meals 60 tablet 12     lisinopril (PRINIVIL,ZESTRIL) 2.5 MG tablet TAKE ONE TABLET BY MOUTH ONE TIME DAILY 90 tablet 4     metFORMIN (GLUCOPHAGE-XR) 750 MG 24 hr tablet Take 2 tablets (1,500 mg total) by mouth daily with breakfast. 60 tablet 12     omeprazole (PRILOSEC) 20 MG capsule TAKE ONE CAPSULE BY MOUTH ONE TIME DAILY 90 capsule 3     No current facility-administered medications on file prior to visit.           Recent Results (from the past 48 hour(s))   Glycosylated Hemoglobin A1c    Collection Time: 09/14/17  4:17 PM   Result Value Ref Range    Hemoglobin A1c 6.7 (H) 3.5 - 6.0 %         Assessment/Plan:  1. Diabetes mellitus  A1c is now normal.  Have encouraged him to continue with his current medications.  He will be mindful of lows and have food or medication available at all times to treat these.  I have encouraged him to continue with exercise.  He will follow-up in 3 months or as needed.  - Glycosylated Hemoglobin A1c  - Comprehensive Metabolic Panel    2. Essential hypertension  Well controlled.  Continue with current meds  - Glycosylated Hemoglobin A1c  - Comprehensive Metabolic Panel    3. Hypercholesterolemia  Continue with current meds.    - Glycosylated Hemoglobin A1c  - Comprehensive Metabolic Panel  - Lipid Cascade; Future    4. Wart  After the risks and benefits were reviewed this was frozen with 3  freeze thaw cycles to today.  He tolerated the procedure well.  There were no complications.  He was instructed in aftercare.    5. Lesion of skin of scalp  This does not appear to be in a cantholysis nigricans.  It was frozen today with 3 freeze thaw cycles.  If it does not resolve completely and/or if it does not heal well then he is to return immediately and this will need a biopsy with possible excision.  This was reviewed with the patient today.          Thelma Mendoza   9/14/2017 3:53 PM

## 2021-06-14 NOTE — PROGRESS NOTES
S:  Rene Guzman is a 65 y.o. male who comes to the clinic today for  1.  Hypertension:    He has been doing his blood pressures at home, unfortunately when his blood pressure cuff was checked against ours today, it was not noted to be the same and his one from home was quite irregular.  It was also quite elevated compared to ours today.  He has made major changes at home both with his diet as well as well his physical activity.    2.  Type 2 diabetes:    His linagliptin was $208 per month, so he didn't pick it up.    He has made diet changes . He has made exercise changes.  He had a blood sugar of 80 in the am.  He denies any chest pain, shortness of breath, new numbness, tingling, weakness in any 1 part of his body with exercising    I reviewed the pertinent family, social, surgical, medical history.      O:  /72 (Patient Site: Right Arm, Patient Position: Sitting, Cuff Size: Adult Regular)   Pulse 94   Temp 97.8  F (36.6  C) (Oral)   Wt 197 lb 8 oz (89.6 kg)   SpO2 98%   BMI 32.87 kg/m    Gen:  Nad, alert      Patient Active Problem List   Diagnosis     Heartburn     Acute Serous Otitis Media     Joint Pain, Localized In The Knee     Synovial Cyst Of Popliteal Space     Hypertension     Seborrheic Keratosis     Abdominal Pain     Hyperlipidemia     Joint Pain, Localized In The Shoulder     Type 2 diabetes mellitus (H)     Tubular adenoma of colon     Basal cell carcinoma     Microalbuminuria     Current Outpatient Medications on File Prior to Visit   Medication Sig Dispense Refill     ACCU-CHEK ANGELA PLUS METER Misc        ACCU-CHEK SOFTCLIX LANCETS lancets        aspirin (ASPIR-LOW) 81 MG EC tablet Take 1 tablet (81 mg total) by mouth daily. (Patient taking differently: Take 81 mg by mouth daily. Every other day      )       atorvastatin (LIPITOR) 40 MG tablet TAKE ONE TABLET BY MOUTH ONE TIME DAILY 90 tablet 3     blood glucose meter (GLUCOMETER) Use 1 each As Directed as needed. Dispense  glucometer brand per patient's insurance at pharmacy discretion. 1 each 0     blood glucose test strips Use 1 each As Directed 2 (two) times a day. Dispense brand per patient's insurance at pharmacy discretion. 250 strip 12     generic lancets (MICROLET LANCET) Dispense brand per patient's insurance at pharmacy discretion. 200 each 11     glipiZIDE (GLUCOTROL) 10 MG tablet TAKE 1 TABLET BY MOUTH TWICE DAILY 1/2 HOUR BEFORE MEALS 90 tablet 3     linaGLIPtin 5 mg Tab Take 1 tablet (5 mg total) by mouth once daily. With or without food 90 tablet 2     lisinopril (PRINIVIL,ZESTRIL) 40 MG tablet Take 1 tablet (40 mg total) by mouth daily. 90 tablet 3     metFORMIN (GLUCOPHAGE-XR) 750 MG 24 hr tablet TAKE 2 TABLETS BY MOUTH ONCE DAILY WITH BREAKFAST 180 tablet 3     omeprazole (PRILOSEC) 20 MG capsule Take by mouth.       sildenafiL (VIAGRA) 100 MG tablet TAKE 1 TABLET BY MOUTH EVERY DAY AS NEEDED FOR ERECTILE DYSFUNCTION 12 tablet 10     metFORMIN (GLUCOPHAGE-XR) 500 MG 24 hr tablet Take 2 tablets (1,000 mg total) by mouth daily with breakfast. 180 tablet 3     No current facility-administered medications on file prior to visit.           No results found for this or any previous visit (from the past 48 hour(s)).     No images are attached to the encounter or orders placed in the encounter.       Assessment/Plan:  1. Hypertension  Well controlled with his increased medication.  Continue with current meds.  I did let him know he needs a new blood pressure cuff and a prescription was provided for this today.  - miscellaneous medical supply (BLOOD PRESSURE CUFF) Misc; Use 1 Device As Directed daily.  Dispense: 1 each; Refill: 0    2.  Type 2 diabetes:    He had a lot of questions today regarding diet changes that would be appropriate.  He also prefers not to start the linagliptin at this time due to the expense.  He prefers to have a period of time where he really works on diet and exercise to see if this improves his  numbers.  It is a that that was fine.  We could also see if another medication such as Trulicity was covered better under his insurance.          Thelma Mendoza   1/26/2021 4:32 PM

## 2021-06-14 NOTE — PROGRESS NOTES
"S:  61 yo male who is here for a recheck of his diabetes, hypertension and hypercholesteremia.  He has not been as good about his exercise.  He is working on his diet. His am numbers in general are too high.  The lowest being 95, but the majority are over 110.  Pm numbers are good.    He is doing ok with his medications.    No headaches or vision changes.    He was diagnosed with a ganglion cyst.  This is in his right wrist.  It does not bother him.  It does not prevent him from working.  No cp, sobr. N/t/w in any part of his body.  Does complain of some mild right neck pain.  This is intermittent in nature.  It started after he lifted up a [place] tree to take it out of the house.  He does occasionally hurt him a little bit worse when he is doing some of his work and has to move his neck in an unusual manner.  He has been seeing a chiropractor for this without great improvement.  No new health problems in the family.      O:  /84  Pulse 82  Temp 98.1  F (36.7  C) (Oral)   Resp 20  Ht 5' 5.5\" (1.664 m)  Wt 200 lb (90.7 kg)  SpO2 98%  BMI 32.78 kg/m2  Gen: no acute distress  Neck:  Supple, no lad, no carotid bruits.  Tenderness along the right scalenes and right trapezius.  Palpation of these reproduces his neck pain and his occasional headaches.  Is full range of motion in his neck.  Heart:  Regular rate and rhythm.  No m/r/g  Lungs: cta bilaterally, no wheezes or rhonchi.  Good air inspiration  Abdomen:  No masses or organomegaly  Extremities:  No edema.   Sensation is intact through his bilateral feet.  He has good hair growth over his feet.  Ankle jerk reflexes are intact.  He does have some mild swelling along his lateral great toenail on the right.  This is minimally tender.        Patient Active Problem List   Diagnosis     Heartburn     Acute Serous Otitis Media     Joint Pain, Localized In The Knee     Synovial Cyst Of Popliteal Space     Hypertension     Seborrheic Keratosis     Abdominal " Pain     Hyperlipidemia     Joint Pain, Localized In The Shoulder     Type 2 diabetes mellitus     Tubular adenoma of colon     Current Outpatient Prescriptions on File Prior to Visit   Medication Sig Dispense Refill     aspirin (ASPIR-LOW) 81 MG EC tablet Take 1 tablet (81 mg total) by mouth daily.       atorvastatin (LIPITOR) 40 MG tablet TAKE ONE TABLET BY MOUTH ONE TIME DAILY 90 tablet 4     glipiZIDE (GLUCOTROL) 5 MG tablet Take 1 tablet (5 mg total) by mouth 2 (two) times a day before meals. 1/2 Hour BEFORE meals 60 tablet 12     lisinopril (PRINIVIL,ZESTRIL) 2.5 MG tablet TAKE ONE TABLET BY MOUTH ONE TIME DAILY 90 tablet 4     metFORMIN (GLUCOPHAGE-XR) 750 MG 24 hr tablet Take 2 tablets (1,500 mg total) by mouth daily with breakfast. 60 tablet 12     omeprazole (PRILOSEC) 20 MG capsule TAKE ONE CAPSULE BY MOUTH ONE TIME DAILY 90 capsule 3     ACCU-CHEK ANGELA PLUS METER Misc        ACCU-CHEK SOFTCLIX LANCETS lancets        blood glucose meter (GLUCOMETER) Use 1 each As Directed as needed. Dispense glucometer brand per patient's insurance at pharmacy discretion. 1 each 0     blood glucose test strips Use 1 each As Directed as needed. Dispense brand per patient's insurance at pharmacy discretion. 250 each 11     generic lancets (MICROLET LANCET) Dispense brand per patient's insurance at pharmacy discretion. 200 each 11     No current facility-administered medications on file prior to visit.           No results found for this or any previous visit (from the past 48 hour(s)).      Assessment/Plan:  1. Essential hypertension  Increase lisinopril to 20mg po daily.  Recheck in 1 week  Encouraged to return to regular exercising.    - Comprehensive Metabolic Panel  - Glycosylated Hemoglobin A1c    2. Diabetes mellitus  If am numbers remain high or a1c is high, consider increasing pm dose of glipizide to 7.5mg.      - Comprehensive Metabolic Panel  - Glycosylated Hemoglobin A1c  - lisinopril (PRINIVIL,ZESTRIL) 20 MG  tablet; Take 1 tablet (20 mg total) by mouth daily.  Dispense: 90 tablet; Refill: 4  - atorvastatin (LIPITOR) 40 MG tablet; TAKE ONE TABLET BY MOUTH ONE TIME DAILY  Dispense: 90 tablet; Refill: 4  - glipiZIDE (GLUCOTROL) 5 MG tablet; Take 1 tablet (5 mg total) by mouth 2 (two) times a day before meals. 1/2 Hour BEFORE meals  Dispense: 60 tablet; Refill: 12  - generic lancets (MICROLET LANCET); Dispense brand per patient's insurance at pharmacy discretion.  Dispense: 200 each; Refill: 11  - blood glucose test strips; Use 1 each As Directed as needed. Dispense brand per patient's insurance at pharmacy discretion.  Dispense: 250 strip; Refill: 12    3. Hypercholesterolemia    - atorvastatin (LIPITOR) 40 MG tablet; TAKE ONE TABLET BY MOUTH ONE TIME DAILY  Dispense: 90 tablet; Refill: 4      Follow up in 3 months or prn.     Thelma Mendoza   12/14/2017 4:17 PM

## 2021-06-14 NOTE — PROGRESS NOTES
I met with Rene Guzman at the request of Dr. Mendoza to recheck his blood pressure.  Blood pressure medications on the MAR were reviewed with patient.    Patient has taken all medications as per usual regimen: Yes  Patient reports tolerating them without any issues or concerns: Yes    Vitals:    12/20/17 1527 12/20/17 1538   BP: 146/82 150/84   Patient Site: Right Arm Left Arm   Patient Position: Sitting Sitting   Cuff Size: Adult Large Adult Large       After 5 minutes, the patient's blood pressure remained > 140/90.    Is the patient currently having any chest pain?  No  Does the patient currently have a headache?   No  Does the patient currently have any vision changes? No  Does the patient currently have any nausea? No  Does the patient currently have any abdominal pain? No    The previous encounter was reviewed.  The patient was discharged and the note will be sent to the provider for final review.

## 2021-06-14 NOTE — TELEPHONE ENCOUNTER
Please call and see what is a covered alternative, and if no alternative, then start a PA.  Thank you.

## 2021-06-14 NOTE — TELEPHONE ENCOUNTER
I d/jen the elisabethuvia and sent in the tradjenta.  I called the patient to let him know.  I reviewed the risks of that medication  The risk of hypoglycemia when combined with glipizide.

## 2021-06-14 NOTE — TELEPHONE ENCOUNTER
Pharmacy states [facility] is not covered by pt insurance. Pt has been on med since 11/30/20.  Pharmacy requesting PA or alternate - no alternates given on fax. Thanks.

## 2021-06-14 NOTE — TELEPHONE ENCOUNTER
Called pharmacy who directed CMT to insurance as no alternate given.  Insurance states only Tradjenta 5 mg one tab a day only is covered.  Everything else would need a PA due to not on formulary.  Thanks.

## 2021-06-14 NOTE — PROGRESS NOTES
"S:  Rene Guzman is a 65 y.o. male who comes to the clinic today for  1.  Hypertension:    He has not been checking his blood pressure at home at all.  He has a blood pressure cuff.  No headaches, vision changes .   No cp or sobr with exercising . He is walking inclined on his treadmill.  He exercises several times weekly.      2.  Type 2 diabetes:    He sometimes still has high blood sugars . He took it this am, his blood sugar was 145.  He is eating pretty well.  No lows.      3.  He snores a lot ,and wakes up 10 times nightly.  He doesn't know why he wakes up.  He and his wife can no logner sleep in the same room due to his snoring.  He denies any significant daytime fatigue.  He is not tired at work.  If he just sits in a chair after eating, he falls asleep.  If he takes a nap, he startes himself awake.  He has felt like he has been choking in the past, and it wakes him up.      I reviewed the pertinent family, social, surgical, medical history.        O:  /90 (Patient Site: Left Arm, Patient Position: Sitting, Cuff Size: Adult Large)   Pulse 86   Temp 98  F (36.7  C) (Oral)   Ht 5' 5\" (1.651 m)   Wt 198 lb (89.8 kg)   SpO2 99%   BMI 32.95 kg/m    Gen: no acute distress  Neck:  Supple, no lad, no carotid bruits  Heart:  Regular rate and rhythm.  No m/r/g  Lungs: cta bilaterally, no wheezes or rhonchi.  Good air inspiration  Abdomen:  No masses or organomegaly, soft.    Extremities:  1+ pitting edema in left lower leg.  Trace in right .   Skin: Two areas of ulceration over the top of his head one of these with some flaking and crusting.        Patient Active Problem List   Diagnosis     Heartburn     Acute Serous Otitis Media     Joint Pain, Localized In The Knee     Synovial Cyst Of Popliteal Space     Hypertension     Seborrheic Keratosis     Abdominal Pain     Hyperlipidemia     Joint Pain, Localized In The Shoulder     Type 2 diabetes mellitus (H)     Tubular adenoma of colon     Basal cell " carcinoma     Current Outpatient Medications on File Prior to Visit   Medication Sig Dispense Refill     ACCU-CHEK ANGELA PLUS METER Misc        ACCU-CHEK SOFTCLIX LANCETS lancets        aspirin (ASPIR-LOW) 81 MG EC tablet Take 1 tablet (81 mg total) by mouth daily. (Patient taking differently: Take 81 mg by mouth daily. Every other day      )       atorvastatin (LIPITOR) 40 MG tablet TAKE ONE TABLET BY MOUTH ONE TIME DAILY 90 tablet 3     blood glucose meter (GLUCOMETER) Use 1 each As Directed as needed. Dispense glucometer brand per patient's insurance at pharmacy discretion. 1 each 0     blood glucose test strips Use 1 each As Directed 2 (two) times a day. Dispense brand per patient's insurance at pharmacy discretion. 250 strip 12     generic lancets (MICROLET LANCET) Dispense brand per patient's insurance at pharmacy discretion. 200 each 11     lisinopril (PRINIVIL,ZESTRIL) 40 MG tablet Take 1 tablet (40 mg total) by mouth daily. 90 tablet 3     metFORMIN (GLUCOPHAGE-XR) 750 MG 24 hr tablet TAKE 2 TABLETS BY MOUTH ONCE DAILY WITH BREAKFAST 180 tablet 3     omeprazole (PRILOSEC) 20 MG capsule Take by mouth.       sildenafiL (VIAGRA) 100 MG tablet TAKE 1 TABLET BY MOUTH EVERY DAY AS NEEDED FOR ERECTILE DYSFUNCTION 12 tablet 10     [DISCONTINUED] glipiZIDE (GLUCOTROL) 5 MG tablet TAKE 1 TABLET BY MOUTH TWICE DAILY 1/2 HOUR BEFORE MEALS 180 tablet 10     [DISCONTINUED] SITagliptin (JANUVIA) 50 MG tablet Take 1 tablet (50 mg total) by mouth daily. With or without food 90 tablet 4     metFORMIN (GLUCOPHAGE-XR) 500 MG 24 hr tablet Take 2 tablets (1,000 mg total) by mouth daily with breakfast. 180 tablet 3     [DISCONTINUED] lisinopriL (PRINIVIL,ZESTRIL) 20 MG tablet TAKE 1 TABLET (20 MG TOTAL) BY MOUTH DAILY. 90 tablet 4     No current facility-administered medications on file prior to visit.           Recent Results (from the past 48 hour(s))   Glycosylated Hemoglobin A1c    Collection Time: 01/19/21  3:26 PM   Result  Value Ref Range    Hemoglobin A1c 8.9 (H) <=5.6 %        No images are attached to the encounter or orders placed in the encounter.       Assessment/Plan:  1. Type 2 diabetes mellitus with hyperglycemia, without long-term current use of insulin (H)  Continue with current dose of Metformin and glipizide. Start Januvia. Continue with diet and exercise changes. Recheck in 3 months.  - SITagliptin (JANUVIA) 50 MG tablet; Take 1 tablet (50 mg total) by mouth daily. With or without food  Dispense: 90 tablet; Refill: 4    2. Diabetes mellitus (H)    - glipiZIDE (GLUCOTROL) 10 MG tablet; TAKE 1 TABLET BY MOUTH TWICE DAILY 1/2 HOUR BEFORE MEALS  Dispense: 90 tablet; Refill: 3  - Microalbumin, Random Urine    3. Snores  Given elevation in blood pressure and the fact that he notes that he stops breathing at certain times and wakes himself up, will refer for sleep study as appropriate.  - Ambulatory referral to Sleep Medicine    4. Hypertension  Poorly controlled today. He will start checking his blood pressure at home and follow-up next week bring in both his blood pressure cuff as well as his readings for recheck. Refer to sleep medicine to rule out sleep apnea. Continue with regular physical exercise.  - Ambulatory referral to Sleep Medicine    5. Actinic keratosis  After the risks and benefits were reviewed and the patient's verbal permission was obtained, two areas on the scalp were frozen with three freeze thaw cycles using liquid nitrogen. He tolerated the procedure well. There were no complications. He is instructed in aftercare. He will have his dermatologist look at them when he goes for routine visit in 1 month.          Thelma Mendoza   1/19/2021 3:45 PM

## 2021-06-14 NOTE — PROGRESS NOTES
Chief Complaint   Patient presents with     Mass     pt noticed it at the end of the day today,        HPI:    Patient is here for noticing a lump at right wrist today without injury, pain, fever, chills. He is a  who uses his hands a lot.     ROS: Pertinent ROS noted in HPI.     Allergies   Allergen Reactions     No Known Drug Allergies        Patient Active Problem List   Diagnosis     Heartburn     Acute Serous Otitis Media     Joint Pain, Localized In The Knee     Synovial Cyst Of Popliteal Space     Hypertension     Seborrheic Keratosis     Abdominal Pain     Hyperlipidemia     Joint Pain, Localized In The Shoulder     Type 2 diabetes mellitus     Tubular adenoma of colon       Family History   Problem Relation Age of Onset     Diabetes Grandchild      type 1 diabetes       Social History     Social History     Marital status:      Spouse name: N/A     Number of children: N/A     Years of education: N/A     Occupational History     Not on file.     Social History Main Topics     Smoking status: Former Smoker     Smokeless tobacco: Never Used     Alcohol use Not on file     Drug use: Not on file     Sexual activity: Not on file     Other Topics Concern     Not on file     Social History Narrative         Objective:    Vitals:    11/20/17 1630   BP: 130/60   Pulse: (!) 103   Resp: 16   Temp: 98.2  F (36.8  C)   SpO2: 97%       Gen:NAD  MSK: 1.5 cm hard, smooth, cystic structure at right lateral wrist with minimal tenderness to palpation, without erythema. Full ROM and strength of right hand and wrist.    Assessment:    Ganglion cyst of wrist, right    Plan:    Other than the cyst, patient is asymptomatic.   F/u with Orthopedics as directed

## 2021-06-15 NOTE — TELEPHONE ENCOUNTER
Refill Approved    Rx renewed per Medication Renewal Policy. Medication was last renewed on 2/19/20, last OV 1/26/21.    Megan Desai, [facility] Triage/Med Refill 2/6/2021     Requested Prescriptions   Pending Prescriptions Disp Refills     atorvastatin (LIPITOR) 40 MG tablet [Pharmacy Med Name: ATORVASTATIN 40 MG TABLET] 90 tablet 3     Sig: TAKE 1 TABLET BY MOUTH EVERY DAY       Statins Refill Protocol (Hmg CoA Reductase Inhibitors) Passed - 2/6/2021 12:16 AM        Passed - PCP or prescribing provider visit in past 12 months      Last office visit with prescriber/PCP: 1/26/2021 Thelma Mendoza MD OR same dept: 1/26/2021 Thelma Mendoza MD OR same specialty: 1/26/2021 Thelma Mendoza MD  Last physical: Visit date not found Last MTM visit: Visit date not found   Next visit within 3 mo: Visit date not found  Next physical within 3 mo: Visit date not found  Prescriber OR PCP: Thelma Mendoza MD  Last diagnosis associated with med order: 1. Diabetes mellitus (H)  - atorvastatin (LIPITOR) 40 MG tablet [Pharmacy Med Name: ATORVASTATIN 40 MG TABLET]; TAKE 1 TABLET BY MOUTH EVERY DAY  Dispense: 90 tablet; Refill: 3    2. Hypercholesterolemia  - atorvastatin (LIPITOR) 40 MG tablet [Pharmacy Med Name: ATORVASTATIN 40 MG TABLET]; TAKE 1 TABLET BY MOUTH EVERY DAY  Dispense: 90 tablet; Refill: 3    If protocol passes may refill for 12 months if within 3 months of last provider visit (or a total of 15 months).

## 2021-06-15 NOTE — TELEPHONE ENCOUNTER
Refill Approved    Rx renewed per Medication Renewal Policy. Medication was last renewed on 1/30/20.    Trevor Sood, [facility] Triage/Med Refill 2/26/2021     Requested Prescriptions   Pending Prescriptions Disp Refills     lisinopriL (PRINIVIL,ZESTRIL) 40 MG tablet [Pharmacy Med Name: LISINOPRIL 40 MG TABLET] 90 tablet 3     Sig: TAKE 1 TABLET BY MOUTH EVERY DAY       Ace Inhibitors Refill Protocol Passed - 2/26/2021 12:24 AM        Passed - PCP or prescribing provider visit in past 12 months       Last office visit with prescriber/PCP: 1/26/2021 Thelma Mendoza MD OR same dept: 1/26/2021 Thelma Mendoza MD OR same specialty: 1/26/2021 Thelma Mendoza MD  Last physical: Visit date not found Last MTM visit: Visit date not found   Next visit within 3 mo: Visit date not found  Next physical within 3 mo: Visit date not found  Prescriber OR PCP: Thelma Mendoza MD  Last diagnosis associated with med order: 1. Diabetes mellitus (H)  - lisinopriL (PRINIVIL,ZESTRIL) 40 MG tablet [Pharmacy Med Name: LISINOPRIL 40 MG TABLET]; TAKE 1 TABLET BY MOUTH EVERY DAY  Dispense: 90 tablet; Refill: 3    If protocol passes may refill for 12 months if within 3 months of last provider visit (or a total of 15 months).             Passed - Serum Potassium in past 12 months     Lab Results   Component Value Date    Potassium 4.3 01/19/2021             Passed - Blood pressure filed in past 12 months     BP Readings from Last 1 Encounters:   01/26/21 124/72             Passed - Serum Creatinine in past 12 months     Creatinine   Date Value Ref Range Status   01/19/2021 1.01 0.70 - 1.30 mg/dL Final

## 2021-06-15 NOTE — TELEPHONE ENCOUNTER
RN cannot approve Refill Request    RN can NOT refill this medication Change in doses. No longer on this dose.. Last office visit: 1/26/2021 Thelma Mendoza MD Last Physical: Visit date not found Last MTM visit: Visit date not found Last visit same specialty: 1/26/2021 Thelma Mendoza MD.  Next visit within 3 mo: Visit date not found  Next physical within 3 mo: Visit date not found      Agatha Godinez, [facility] Triage/Med Refill 2/7/2021    Requested Prescriptions   Pending Prescriptions Disp Refills     glipiZIDE (GLUCOTROL) 5 MG tablet [Pharmacy Med Name: GLIPIZIDE 5 MG TABLET] 270 tablet 4     Sig: TAKE 1 TABLET BY MOUTH IN THE MORNING AND 2 TABLETS IN THE EVENING       Oral Hypoglycemics Refill Protocol Passed - 2/7/2021 12:21 AM        Passed - Visit with PCP or prescribing provider visit in last 6 months       Last office visit with prescriber/PCP: 1/26/2021 OR same dept: 1/26/2021 Thelma Mendoza MD OR same specialty: 1/26/2021 Thelma Mendoza MD Last physical: Visit date not found Last MTM visit: Visit date not found         Next appt within 3 mo: Visit date not found  Next physical within 3 mo: Visit date not found  Prescriber OR PCP: Thelma Mendoza MD  Last diagnosis associated with med order: 1. Diabetes mellitus (H)  - glipiZIDE (GLUCOTROL) 5 MG tablet [Pharmacy Med Name: GLIPIZIDE 5 MG TABLET]; TAKE 1 TABLET BY MOUTH IN THE MORNING AND 2 TABLETS IN THE EVENING  Dispense: 270 tablet; Refill: 4     If protocol passes may refill for 12 months if within 3 months of last provider visit (or a total of 15 months).           Passed - A1C in last 6 months     Hemoglobin A1c   Date Value Ref Range Status   01/19/2021 8.9 (H) <=5.6 % Final               Passed - Microalbumin in last year      Microalbumin, Random Urine   Date Value Ref Range Status   01/19/2021 5.26 (H) 0.00 - 1.99 mg/dL Final                  Passed - Blood pressure in last year     BP Readings from Last 1  Encounters:   01/26/21 124/72             Passed - Serum creatinine in last year     Creatinine   Date Value Ref Range Status   01/19/2021 1.01 0.70 - 1.30 mg/dL Final

## 2021-06-16 PROBLEM — R80.9 MICROALBUMINURIA: Status: ACTIVE | Noted: 2021-01-19

## 2021-06-16 PROBLEM — C44.91 BASAL CELL CARCINOMA: Status: ACTIVE | Noted: 2020-01-31

## 2021-06-16 NOTE — PROGRESS NOTES
"S:  Rene Guzman is a 66 y.o. male who comes to the clinic today for  1.  Type 2 diabetes:  He has been on  The treadmill daily.  He walks for 15-20 minutes daily . And he walks for 25 minutes on the weekend.  He walks on an incline.  His clothes are fitting him looser.  He has found a way to eat that satisfies him.  He has decreased his portions.  He has found that he can't eat certain foods because it spikes his blood sugar.    He stopped eating candy.    He has not had any low blood sugars.  The lowest he had was 80.  He is taking glipizide 10mg in the am and 10 in the pm.    Stool is still runny.  He always has an irritable stomach, he has some stomach upset, and has had some loose stool.      2.  Hypertension:  His bp at home is higher in the am.  It is better when he gets home from work.  It is 140's in the am/80.      I reviewed the pertinent family, social, surgical, medical history.    He got his covid vaccine 3 weeks ago.      3.  He did not have success with his sildenafil.  He had some premature ejaculation in addition to some difficulty with getting an erection.      O:  /72 (Patient Site: Left Arm, Patient Position: Sitting, Cuff Size: Adult Regular)   Pulse 96   Temp 97.6  F (36.4  C) (Oral)   Ht 5' 5\" (1.651 m)   Wt 189 lb 1.9 oz (85.8 kg)   SpO2 97%   BMI 31.47 kg/m    Gen: no acute distress  Neck:  Supple, no lad, no carotid bruits  Heart:  Regular rate and rhythm.  No m/r/g  Lungs: cta bilaterally, no wheezes or rhonchi.  Good air inspiration  Abdomen:  No masses or organomegaly.  Soft.   Non tender.    Extremities:  No edema.           Patient Active Problem List   Diagnosis     Heartburn     Acute Serous Otitis Media     Joint Pain, Localized In The Knee     Synovial Cyst Of Popliteal Space     Hypertension     Seborrheic Keratosis     Abdominal Pain     Hyperlipidemia     Joint Pain, Localized In The Shoulder     Type 2 diabetes mellitus (H)     Tubular adenoma of colon     " Basal cell carcinoma     Microalbuminuria     Current Outpatient Medications on File Prior to Visit   Medication Sig Dispense Refill     ACCU-CHEK ANGELA PLUS METER [facility]        ACCU-CHEK SOFTCLIX LANCETS lancets        aspirin (ASPIR-LOW) 81 MG EC tablet Take 1 tablet (81 mg total) by mouth daily. (Patient taking differently: Take 81 mg by mouth daily. Every other day      )       atorvastatin (LIPITOR) 40 MG tablet TAKE 1 TABLET BY MOUTH EVERY DAY 90 tablet 3     blood glucose meter (GLUCOMETER) Use 1 each As Directed as needed. Dispense glucometer brand per patient's insurance at pharmacy discretion. 1 each 0     blood glucose test strips Use 1 each As Directed 2 (two) times a day. Dispense brand per patient's insurance at pharmacy discretion. 250 strip 12     blood glucose test strips Use 1 each As Directed Daily at 8:00 am.. Dispense brand per patient's insurance at pharmacy discretion. 100 strip 8     generic lancets (MICROLET LANCET) Dispense brand per patient's insurance at pharmacy discretion. 200 each 11     glipiZIDE (GLUCOTROL) 10 MG tablet TAKE 1 TABLET BY MOUTH TWICE DAILY 1/2 HOUR BEFORE MEALS 90 tablet 3     lisinopriL (PRINIVIL,ZESTRIL) 40 MG tablet TAKE 1 TABLET BY MOUTH EVERY DAY 90 tablet 3     metFORMIN (GLUCOPHAGE-XR) 750 MG 24 hr tablet TAKE 2 TABLETS BY MOUTH ONCE DAILY WITH BREAKFAST 180 tablet 3     miscellaneous medical supply (BLOOD PRESSURE CUFF) Misc Use 1 Device As Directed daily. 1 each 0     omeprazole (PRILOSEC) 20 MG capsule TAKE 1 CAPSULE BY MOUTH EVERY DAY 90 capsule 3     sildenafiL (VIAGRA) 100 MG tablet TAKE 1 TABLET BY MOUTH EVERY DAY AS NEEDED FOR ERECTILE DYSFUNCTION 12 tablet 10     metFORMIN (GLUCOPHAGE-XR) 500 MG 24 hr tablet Take 2 tablets (1,000 mg total) by mouth daily with breakfast. 180 tablet 3     No current facility-administered medications on file prior to visit.           Recent Results (from the past 48 hour(s))   Glycosylated Hemoglobin A1c    Collection Time:  04/19/21  2:25 PM   Result Value Ref Range    Hemoglobin A1c 7.8 (H) <=5.6 %        No images are attached to the encounter or orders placed in the encounter.       Assessment/Plan:  1. Type 2 diabetes mellitus with hyperglycemia, without long-term current use of insulin (H)  Now with significantly improved A1c and readings at home.  I did ask him to let me know if he continues to have significant bowel disturbances from his Metformin.  At that time we could decrease his dose or trying to separate his dose to see if this diminishes his diarrhea.  I congratulated him on the amazing work that he has done to improve his diabetes numbers.  He will continue to work on this and come back for an A1c in 3 months and then recheck with me in 6 months.  He will come back sooner if his numbers are going up.  If he starts to develop lows he is also to let me know so we can decrease his dose of glipizide.  - Glycosylated Hemoglobin A1c    2. Hypertension  Well-controlled at this time.  Continue with current medications.    3. Erectile dysfunction, unspecified erectile dysfunction type  This appears to be a mixed picture including premature ejaculation as well as some component of erectile dysfunction.  He will continue with the sildenafil.  If this is insufficient or he continues to have issues he can let me know and I will refer him to urology.  - sildenafiL (VIAGRA) 100 MG tablet; TAKE 1 TABLET BY MOUTH EVERY DAY AS NEEDED FOR ERECTILE DYSFUNCTION  Dispense: 12 tablet; Refill: 10          Thelma Mendoza   4/19/2021 2:49 PM

## 2021-06-16 NOTE — TELEPHONE ENCOUNTER
Refill Request  Did you contact pharmacy: Yes  Medication name: Accu check test strips test once daily   Requested Prescriptions      No prescriptions requested or ordered in this encounter     Who prescribed the medication: historical provider    Requested Pharmacy: [facility]     Is patient out of medication: Yes     Patient notified refills processed in 3 business days:  No    Okay to leave a detailed message: no    Call taken 3/25/21 at 4/25 pm by Nhi Espinoza CMA., CMT

## 2021-06-16 NOTE — TELEPHONE ENCOUNTER
RN cannot approve Refill Request    RN can NOT refill this medication historical medication requested. Last office visit: 1/26/2021 Thelma Mendoza MD Last Physical: Visit date not found Last MTM visit: Visit date not found Last visit same specialty: 1/26/2021 Thelma Mendoza MD.  Next visit within 3 mo: Visit date not found  Next physical within 3 mo: Visit date not found      Trevor Sood, [facility] Triage/Med Refill 3/24/2021    Requested Prescriptions   Pending Prescriptions Disp Refills     omeprazole (PRILOSEC) 20 MG capsule [Pharmacy Med Name: OMEPRAZOLE DR 20 MG CAPSULE] 90 capsule 3     Sig: TAKE 1 CAPSULE BY MOUTH EVERY DAY       GI Medications Refill Protocol Passed - 3/24/2021 12:13 AM        Passed - PCP or prescribing provider visit in last 12 or next 3 months.     Last office visit with prescriber/PCP: 1/26/2021 Thelma Mendoza MD OR same dept: 1/26/2021 Thelma Mendoza MD OR same specialty: 1/26/2021 Thelma Mendoza MD  Last physical: Visit date not found Last MTM visit: Visit date not found   Next visit within 3 mo: Visit date not found  Next physical within 3 mo: Visit date not found  Prescriber OR PCP: Thelma Mendoza MD  Last diagnosis associated with med order: 1. Heartburn  - omeprazole (PRILOSEC) 20 MG capsule [Pharmacy Med Name: OMEPRAZOLE DR 20 MG CAPSULE]; TAKE 1 CAPSULE BY MOUTH EVERY DAY  Dispense: 90 capsule; Refill: 3    If protocol passes may refill for 12 months if within 3 months of last provider visit (or a total of 15 months).

## 2021-06-16 NOTE — PROGRESS NOTES
"S:  62 yo male who is here for a recheck of his diabetes.  He is back on his treadmill.  He denies any cp, sobr.    He brings in his blood sugar readings today.  He is consistently checking in the a.m. prior to eating and prior to his evening meal.  His numbers are quite variable.  In the morning he can be anywhere from 80s or 90s all the way up into the 150s.  In the evening he is a little more consistent in the 120s-130s.  He does not say that he knows what causes him to be variable earlier on in the day.  He has No notes about what it is that he eats.  He continues to try to make slow changes with his eating.  He does note that when he is out shoveling prior to supper that it drops his blood sugar about 10-15 points.  At her last visit I had asked him to increase his nighttime glipizide to 7.5 mg.  He did not do that.  He denies any numbness, tingling in his fingers or his feet.  Denies any changes to his feet.  He does continue to complain of right neck pain.  This is intermittent.  It does improve somewhat with massage.  There is no weakness on the right side.  He does avoid turning his head fully when he is at work due to the discomfort.    O:  /70  Pulse 98  Temp 97.9  F (36.6  C) (Oral)   Resp 20  Ht 5' 5.5\" (1.664 m)  Wt 199 lb (90.3 kg)  SpO2 95%  BMI 32.61 kg/m2  Gen: no acute distress  Neck:  Supple, no lad, no carotid bruits.  Tender in the scalenes along the right-hand side.  Palpation of these reproduces all of his neck pain.  Heart:  Regular rate and rhythm.  No m/r/g  Lungs: cta bilaterally, no wheezes or rhonchi.  Good air inspiration  Abdomen:  No masses or organomegaly  Extremities:  No edema.     Normal skin over feet with normal hair growth and normal nails.  Sensation intact to monofilament over bilateral feet.  2+ dorsalis pedis pulses noted bilaterally.        Patient Active Problem List   Diagnosis     Heartburn     Acute Serous Otitis Media     Joint Pain, Localized In The " Knee     Synovial Cyst Of Popliteal Space     Hypertension     Seborrheic Keratosis     Abdominal Pain     Hyperlipidemia     Joint Pain, Localized In The Shoulder     Type 2 diabetes mellitus     Tubular adenoma of colon     Current Outpatient Prescriptions on File Prior to Visit   Medication Sig Dispense Refill     ACCU-CHEK ANGELA PLUS METER Misc        ACCU-CHEK SOFTCLIX LANCETS lancets        aspirin (ASPIR-LOW) 81 MG EC tablet Take 1 tablet (81 mg total) by mouth daily.       atorvastatin (LIPITOR) 40 MG tablet TAKE ONE TABLET BY MOUTH ONE TIME DAILY 90 tablet 4     blood glucose meter (GLUCOMETER) Use 1 each As Directed as needed. Dispense glucometer brand per patient's insurance at pharmacy discretion. 1 each 0     blood glucose test strips Use 1 each As Directed 2 (two) times a day. Dispense brand per patient's insurance at pharmacy discretion. 250 strip 12     generic lancets (MICROLET LANCET) Dispense brand per patient's insurance at pharmacy discretion. 200 each 11     lisinopril (PRINIVIL,ZESTRIL) 20 MG tablet Take 1 tablet (20 mg total) by mouth daily. 90 tablet 4     metFORMIN (GLUCOPHAGE-XR) 750 MG 24 hr tablet Take 2 tablets (1,500 mg total) by mouth daily with breakfast. 60 tablet 12     omeprazole (PRILOSEC) 20 MG capsule TAKE ONE CAPSULE BY MOUTH ONE TIME DAILY 90 capsule 3     glipiZIDE (GLUCOTROL) 5 MG tablet Take 5mg in the am and 7.5 mg in the pm 90 tablet 12     No current facility-administered medications on file prior to visit.           Recent Results (from the past 48 hour(s))   Glycosylated Hemoglobin A1c    Collection Time: 03/15/18  4:18 PM   Result Value Ref Range    Hemoglobin A1c 7.3 (H) 3.5 - 6.0 %         Assessment/Plan  1. Hypertension  With current medications.  - Glycosylated Hemoglobin A1c  - Comprehensive Metabolic Panel    2. Type 2 diabetes mellitus  We will hold his glipizide at 5 mg p.o. twice daily at this time.  If his a.m. numbers are consistently elevated then I have  asked him to increase his p.m. glipizide to 7.5 mg.  I did ask him to note what helps him to bring his blood sugars down and to see if he can make those changes consistent.  I encouraged him to continue with his time on the treadmill again.    - Glycosylated Hemoglobin A1c  - Comprehensive Metabolic Panel    3. Musculoskeletal neck pain  I encouraged him to continue to get massages, and to see if Pt.OT can help him to improve his pain and his ROM.    - Ambulatory referral to PT/OT    4. Urine test positive for microalbuminuria  Continue with lisinopril and good diabetes control.            Thelma Mendoza   3/15/2018 4:56 PM

## 2021-06-17 NOTE — PROGRESS NOTES
"[facility] Daily Progress     Patient Name: Rene Guzman  Date: 4/27/2018  Visit #: 5  PTA visit #:   Referral Diagnosis: neck pain  Referring provider: Thelma Mendoza MD  Visit Diagnosis:     ICD-10-CM    1. Neck pain on right side M54.2    2. Neck stiffness M43.6    3. Abnormal posture R29.3          Assessment:     Patient is benefitting from skilled physical therapy and is making steady progress toward functional goals.  Patient is appropriate to continue with skilled physical therapy intervention, as indicated by initial plan of care.     Pt has showed progress with PT and having less overall pain. Pain continues to be present with cervical extension or quick R SB or rotation, but less in general. Believed to be postural in nature with muscle tightness involvement. Pt has been compliant and independent with HEP thus far. He has been more aware of his posture and plans to continue with HEP independently at this time. Pt's chart will remain open for 30 days in case he returns to PT.     Goal Status:  Pt will: be independent with HEP within 3 weeks. (Goal Met)  Pt will: improve neck rotation and sidebending by 20% or more for driving and work within 6 weeks. (Not Met)  Pt will: be able to work with 2/10 pain or less within 8 weeks. (Slight improvement)  Pt will: decrease NDI by 5 points or more to demonstrate improved function within 8 weeks. (Not assessed)    Plan / Patient Education:     Continue with initial plan of care.  Progress with home program as tolerated.     Hold chart for up to 30 days in case patient returns to PT. Pt will be discharged from PT at that time if he does not return.    Subjective:   Pain Rating: \"just a little\"  Pt shares that his neck might be a little better. He does not remember any changes after last session. He does feel that massage is helpful and wonders if going more often would be helpful. He plans to see how he feels the next couple weeks. Pt feels that " "he has improved 10% since beginning PT.      Objective:     Neck Disability Score in %: 14     Reviewed HEP    Cervical ROM:             Date: 3/30/18  4/27/18     *Indicate scale AROM AROM AROM   Cervical Flexion WFL, tightness  WFL     Cervical Extension 16 cm from chest  no change       Right Left Right Left Right Left   Cervical Sidebending 25 deg 35 deg  25 deg 37 deg       Cervical Rotation 48 deg 65 deg  55 deg 65 deg       Cervical Protraction         Cervical Retraction         Thoracic Flexion         Thoracic Extension         Thoracic Sidebending               Thoracic Rotation                   Exercises:  Exercise #1: upper trap stretch: HEP  Comment #1: levator stretch: HEP  Exercise #2: educated on posture with scap sets  Comment #2: cervical snags: focus on R rotation x5   Exercise #3: scalene stretch: HEP  Comment #3: 30\"  Exercise #4: SL reach and roll: reviewed, HEP  Comment #4: chin tucks: x10, 5 sec holds, educated in sitting for work  Exercise #5: supine thoracic rolls: x10 on foam roller      Treatment Today     TREATMENT MINUTES COMMENTS   Evaluation     Self-care/ Home management     Manual therapy 15 In supine:  - STM to R upper trap, levator, and scalenes   Neuromuscular Re-education     Therapeutic Activity     Therapeutic Exercises 12 See flow sheet. Reviewed HEP.     Gait training     Modality__________________                Total 27    Blank areas are intentional and mean the treatment did not include these items.       Arben Sebastian, PT, DPT  4/27/2018  "

## 2021-06-17 NOTE — PROGRESS NOTES
[facility]   Cervical Thoracic Initial Evaluation    Patient Name: Rene Guzman  Date of evaluation: 3/30/2018  Referral Diagnosis: Musculoskeletal neck pain  Referring provider: Thelma Mendoza MD  Visit Diagnosis:     ICD-10-CM    1. Neck pain on right side M54.2    2. Neck stiffness M43.6    3. Abnormal posture R29.3        Assessment:     Rene Guzman is a 63 y.o. 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 Pt demonstrates limitations with cervical ROM including R sidebending and rotation. Tightness throughout upper traps, scalenes, and SCM may also be contributing to pain and limited ROM. Pt denies any neurological or radicular symptoms. Poor cervical spine mobility also evident. Pt would benefit PT to improve his neck ROM, posture, and overall function. Pt reported h/o hyperlipidemia, type 2 diabetes, and hypertension.  Pain symptoms are not improving.  Functional impairments include turning head/neck, bathing, sitting, and ADLs.  Pt demo's signs and sx consistent with cervical facet arthropathy with myofascial tightness.     Impairments in  pain, posture, ROM, joint mobility, strength, ADL's  The POC is dynamic and will be modified on an ongoing basis.  Patient will return to clinic if symptoms persist.  Barriers to achieving goals as noted in the assessment section may affect outcome.  Prognosis to achieve goals is  good   Skilled PT is required to improve ROM, flexibility, cervical mobility, posture, ADL function, and reduce pain.  Pt. is appropriate for skilled PT intervention as outlined in the Plan of Care (POC).  Pt. is a good candidate for skilled PT services to improve pain levels and function.    Goals:  Pt will: be independent with HEP within 3 weeks.  Pt will: improve neck rotation and sidebending by 20% or more for driving and work within 6 weeks.  Pt will: be  able to work with 2/10 pain or less within 8 weeks.  Pt will: decrease NDI by 5 points or more to demonstrate improved function within 8 weeks.    Patient's expectations/goals are realistic.    Barriers to Learning or Achieving Goals:  No Barriers.       Plan / Patient Instructions:        Plan of Care:   Communication with: Referral Source  Patient Related Instruction: Nature of Condition;Treatment plan and rationale;Self Care instruction;Basis of treatment;Body mechanics;Expected outcome;Next steps;Precautions;Posture  Times per Week: 1-2  Number of Weeks: 4-5  Number of Visits: 8-10  Discharge Planning: Pt will be discharged when all goals are met, lack of progress or worsening condition, MD referral, and/or pt desires to continue with HEP independently.  Precautions / Restrictions : none  Therapeutic Exercise: ROM;Stretching;Strengthening  Neuromuscular Reeducation: posture;kinesio tape;TNE;core  Manual Therapy: soft tissue mobilization;myofascial release;muscle energy;joint mobilization;lymphatic drainage massage  Modalities: ultrasound;electrical stimulation;cold pack;traction;TENS;iontophoresis;hot pack  Functional Training (ADL's): self care;instructions for equipment;instructions in transfers;ADL's;meal prep;ergonomics  Equipment: theraband;TENS unit     POC was discussed with patient and patient is in agreement with this plan    Plan for next visit: review HEP, continue STM to R upper trap/SCM/scalenes, continue snags for ROM, TPR may be helpful, postural progression, scalene stretching     Subjective:       Social information:   Occupation:    Work Status:Working full time   Equipment Available: None    History of Present Illness:    Rene is a 63 y.o. male who presents to therapy today with complaints of R sided neck pain that has been ongoing for about 2 years. He says that he noticed the pain in the neck since carrying and lifting some [place] trees out of [place]. He notices most of the  pain when keeping his head in prolonged extension like at work when using a normal machine. He has had massages in the past which did help, but he is unable to remember how long it was helpful. Symptoms are intermittent and not improving. He reports  an episodic  history of similar symptoms. He describes their previous level of function as not limited.    Pain Ratin  Pain rating at best: 0  Pain rating at worst: 10  Pain description: aching and sharp    Functional limitations are described as occurring with:   looking up, looking down, looking at computer and turning head  personal cares washing hair  performing routine daily activities  sitting : 5 minute tolerance         Objective:      Note: Items left blank indicates the item was not performed or not indicated at the time of the evaluation.    Patient Outcome Measures :    No Data Recorded   Scores range from 0-100%, where a score of 0% represents minimal pain and maximal function. The minmal clinically important difference is a score reduction of 10%.    Cervical Thoracic Examination  1. Neck pain on right side     2. Neck stiffness     3. Abnormal posture       Involved side: Right  Posture Observation:      General sitting posture is  fair.  General standing posture is fair.    Cervical ROM:    Date: 3/30/18     *Indicate scale AROM AROM AROM   Cervical Flexion WFL, tightness     Cervical Extension 16 cm from chest      Right Left Right Left Right Left   Cervical Sidebending 25 deg 35 deg       Cervical Rotation 48 deg 65 deg       Cervical Protraction      Cervical Retraction      Thoracic Flexion      Thoracic Extension      Thoracic Sidebending         Thoracic Rotation           Strength     Date: 3/30/18     Cervical Myotomes/5 Right Left Right Left Right Left   Cervical Flexion (C1-2)         Cervical Sidebending (C3)         Shoulder Elevation (C4) 5 5       Shoulder Abduction (C5) 5 5       Elbow Flexion (C6) 5 5       Elbow Extension (C7) 5 5        Wrist Flexion (C7)         Wrist Extension (C6)         Thumb abduction (C8)         Finger Abduction (T1)           Sensation: not impaired  Flexibility: general limitations with ROM R sidebending and rotation    Palpation: tenderness to R    Passive Mobility-Joint Integrity: Hypomobile.    Cervical Special Tests     Cervical Special Tests Right Left UE Nerve Mobility Right Left   Cervical compression - - Median nerve     Cervical distraction - - Ulnar nerve     Spurling s test - - Radial nerve     Shoulder abduction sign   Thoracic outlet     Deep neck flexor endurance test   José Miguel     Upper cervical rotation   Adson s     Sharper-Pineda   Cervical rotation lateral flexion     Alar ligament test   Other:     Other:   Other:         Treatment Today     TREATMENT MINUTES COMMENTS   Evaluation 30    Self-care/ Home management     Manual therapy 10 In supine:  - STM to R SCM, upper trap, and scalenes   Neuromuscular Re-education     Therapeutic Activity     Therapeutic Exercises 15 See exercises for HEP. Pt educated how posture and muscle tightness may be related to his pain. Discussed POC.    Gait training     Modality__________________                Total 55    Blank areas are intentional and mean the treatment did not include these items.     PT Evaluation Code: (Please list factors)  Patient History/Comorbidities: hyperlipidemia, type 2 diabetes, hypertension  Examination: R sided neck pain, neck stiffness, abnormal posture  Clinical Presentation: stable  Clinical Decision Making: low    Patient History/  Comorbidities Examination  (body structures and functions, activity limitations, and/or participation restrictions) Clinical Presentation Clinical Decision Making (Complexity)   No documented Comorbidities or personal factors 1-2 Elements Stable and/or uncomplicated Low   1-2 documented comorbidities or personal factor 3 Elements Evolving clinical presentation with changing characteristics Moderate   3-4  documented comorbidities or personal factors 4 or more Unstable and unpredictable High          Arben OTILIA Puls, PT, DPT  3/30/2018  3:20 PM

## 2021-06-17 NOTE — PROGRESS NOTES
"[facility] Daily Progress     Patient Name: Rene Guzman  Date: 2018  Visit #:2  PTA visit #: 1  Referral Diagnosis: [unfilled]  Referring provider: Thelma Mendoza MD  Visit Diagnosis:     ICD-10-CM    1. Neck pain on right side M54.2    2. Neck stiffness M43.6    3. Abnormal posture R29.3          Assessment:   Pt was seen today for his first follow up appointment. Pt presents with poor sitting posture. Sits slumped.  TP noted R levator.   Patient is benefitting from skilled physical therapy and is making steady progress toward functional goals.  Patient is appropriate to continue with skilled physical therapy intervention, as indicated by initial plan of care.    Goal Status:  No Data Recorded  Pt will: be independent with HEP within 3 weeks.  Pt will: improve neck rotation and sidebending by 20% or more for driving and work within 6 weeks.  Pt will: be able to work with 2/10 pain or less within 8 weeks.  Pt will: decrease NDI by 5 points or more to demonstrate improved function within 8 weeks.    Plan / Patient Education:     Continue with initial plan of care.  Progress with home program as tolerated.    Subjective:   Pt states his neck is \" ok\" today. Pt states increased pain when forward bending and extending his neck.   \" I'm doing my exercises.\"  Pt states he has not had a HA for the last few days.  Pain Ratin currently pain increased with movement        Objective:   Cervical ROM:  Flexion: WNL pain with going into flexion  Extension: WNL  Side bending: R minimal loss pain, L WNL   Rotation:  R minimal loss pain, L WNL     Exercises:  Exercise #1: upper trap stretch: x3 B with 15 sec holds  Comment #1: levator stretch: x2 B with 10 sec holds  Exercise #2: educated on posture with scap sets  Comment #2: cervical snags: focus on R rotation x5       Treatment Today     TREATMENT MINUTES COMMENTS   Evaluation     Self-care/ Home management     Manual therapy 15 TP release to " LUCILA Sahu  STM/MFR to L upper traps, SCM and scalenes   Neuromuscular Re-education     Therapeutic Activity     Therapeutic Exercises 10 See flow sheet   Gait training     Modality__________________                Total 25    Blank areas are intentional and mean the treatment did not include these items.       Shana Schwartz PTA,CLT  4/11/2018

## 2021-06-17 NOTE — TELEPHONE ENCOUNTER
Refill Approved    Rx renewed per Medication Renewal Policy. Medication was last renewed on 1/19/21.    Trevor Sood, [facility] Triage/Med Refill 5/24/2021     Requested Prescriptions   Pending Prescriptions Disp Refills     glipiZIDE (GLUCOTROL) 10 MG tablet [Pharmacy Med Name: GLIPIZIDE 10 MG TABLET] 180 tablet 1     Sig: TAKE 1 TABLET BY MOUTH TWICE DAILY 1/2 HOUR BEFORE MEALS       Oral Hypoglycemics Refill Protocol Passed - 5/23/2021 12:24 AM        Passed - Visit with PCP or prescribing provider visit in last 6 months       Last office visit with prescriber/PCP: 4/19/2021 OR same dept: 4/19/2021 Thelma Mendoza MD OR same specialty: 4/19/2021 Thelma Mendoza MD Last physical: Visit date not found Last MTM visit: Visit date not found         Next appt within 3 mo: Visit date not found  Next physical within 3 mo: Visit date not found  Prescriber OR PCP: Thelma Mendoza MD  Last diagnosis associated with med order: 1. Diabetes mellitus (H)  - glipiZIDE (GLUCOTROL) 10 MG tablet [Pharmacy Med Name: GLIPIZIDE 10 MG TABLET]; TAKE 1 TABLET BY MOUTH TWICE DAILY 1/2 HOUR BEFORE MEALS  Dispense: 180 tablet; Refill: 1     If protocol passes may refill for 12 months if within 3 months of last provider visit (or a total of 15 months).           Passed - A1C in last 6 months     Hemoglobin A1c   Date Value Ref Range Status   04/19/2021 7.8 (H) <=5.6 % Final               Passed - Microalbumin in last year      Microalbumin, Random Urine   Date Value Ref Range Status   01/19/2021 5.26 (H) 0.00 - 1.99 mg/dL Final                  Passed - Blood pressure in last year     BP Readings from Last 1 Encounters:   04/19/21 138/72             Passed - Serum creatinine in last year     Creatinine   Date Value Ref Range Status   01/19/2021 1.01 0.70 - 1.30 mg/dL Final

## 2021-06-17 NOTE — PROGRESS NOTES
"[facility] Daily Progress     Patient Name: Rene Guzman  Date: 2018  Visit #: 4  PTA visit #:   Referral Diagnosis: neck pain  Referring provider: Thelma Mendoza MD  Visit Diagnosis:     ICD-10-CM    1. Neck pain on right side M54.2    2. Neck stiffness M43.6    3. Abnormal posture R29.3          Assessment:     Patient is benefitting from skilled physical therapy and is making steady progress toward functional goals.  Patient is appropriate to continue with skilled physical therapy intervention, as indicated by initial plan of care.     Pt having minimal changes in pain levels thus far. Continues to present with forward head and poor posture which is believed to be related to pain levels. Pt tolerating cervical METs today which were helpful with flexibility and necks stiffness. Tolerating HEP well and added chin tucks for posture progression.     Goal Status:  Pt will: be independent with HEP within 3 weeks.  Pt will: improve neck rotation and sidebending by 20% or more for driving and work within 6 weeks.  Pt will: be able to work with 2/10 pain or less within 8 weeks.  Pt will: decrease NDI by 5 points or more to demonstrate improved function within 8 weeks.    Plan / Patient Education:     Continue with initial plan of care.  Progress with home program as tolerated.     Next session: cervical MET if helpful, continue thoracic mobility exercises, postural progression, reassess ROM    Subjective:   Pain Ratin-2  Pt says that his pain levels are about the same. It has been better over the past few days since he was using a working a standing machine the other day.        Objective:     Continued limitations with cervical rotation bilaterally, less \"tightness\" after METs    Good technique with chin tucks, educated in sitting or standing for work    No pain increases with thoracic mobility exercises    Exercises:  Exercise #1: upper trap stretch: HEP  Comment #1: levator stretch: " "HEP  Exercise #2: educated on posture with scap sets  Comment #2: cervical snags: focus on R rotation x5   Exercise #3: scalene stretch: HEP  Comment #3: 30\"  Exercise #4: SL reach and roll: reviewed, HEP  Comment #4: chin tucks: x10, 5 sec holds, educated in sitting for work  Exercise #5: supine thoracic rolls: x10 on foam roller      Treatment Today     TREATMENT MINUTES COMMENTS   Evaluation     Self-care/ Home management     Manual therapy 10 In sitting:  - R/L rotation, flexion cervical METs x5 B with 5 sec isometrics, with 15 sec holds   Neuromuscular Re-education     Therapeutic Activity     Therapeutic Exercises 18 See flow sheet. Added chin tucks to HEP.     Gait training     Modality__________________                Total 28    Blank areas are intentional and mean the treatment did not include these items.       Arben Sebastian, PT, DPT  4/18/2018    "

## 2021-06-17 NOTE — PROGRESS NOTES
"[facility] Daily Progress     Patient Name: Rene Guzman  Date: 2018  Visit #:3  PTA visit #: 2  Referral Diagnosis: [unfilled]  Referring provider: Thelma Mendoza MD  Visit Diagnosis:     ICD-10-CM    1. Neck pain on right side M54.2    2. Neck stiffness M43.6    3. Abnormal posture R29.3          Assessment:    Pt presents with poor sitting posture. Sits slumped corrects with cues.  TP noted R levator.   Patient is benefitting from skilled physical therapy and is making steady progress toward functional goals.  Patient is appropriate to continue with skilled physical therapy intervention, as indicated by initial plan of care.    Goal Status:  No Data Recorded  Pt will: be independent with HEP within 3 weeks.  Pt will: improve neck rotation and sidebending by 20% or more for driving and work within 6 weeks.  Pt will: be able to work with 2/10 pain or less within 8 weeks.  Pt will: decrease NDI by 5 points or more to demonstrate improved function within 8 weeks.    Plan / Patient Education:     Continue with initial plan of care.  Progress with home program as tolerated.   Go over HEP. Continue with STM/MFR and TPR.    Subjective:   Pt states he is not feeling too bad today. Pt states he is not having a HA today.   Pt did do a lot of shoveling and blowing snow over the weekend \" It felt fine.\"  Pain Ratin-2 currently pain increased with movement        Objective:   Cervical ROM:  Flexion: WNL pain with initial movement into flexion, decreased with repetition  Extension: WNL  Side bending: R minimal loss pain, L WNL   Rotation:  R minimal loss pain, L WNL     Exercises:  Exercise #1: upper trap stretch: x3 B with 15 sec holds  Comment #1: levator stretch: x2 B with 10 sec holds  Exercise #2: educated on posture with scap sets  Comment #2: cervical snags: focus on R rotation x5   Exercise #3: scalene stretch  Comment #3: 30\"  Exercise #4: SL reach and roll  Comment #4: x5 R and L " to inrease to 10 reps, 1-2x/day      Treatment Today     TREATMENT MINUTES COMMENTS   Evaluation     Self-care/ Home management     Manual therapy 15 TP release to R Budator  STM/MFR to L upper traps, SCM and scalenes   Neuromuscular Re-education     Therapeutic Activity     Therapeutic Exercises 10 See flow sheet  -added to HEP:  -SL reach and roll     Gait training     Modality__________________                Total 25    Blank areas are intentional and mean the treatment did not include these items.       Shana Schwartz PTA,CLT  4/16/2018

## 2021-06-17 NOTE — PATIENT INSTRUCTIONS - HE
Patient Instructions by Thelma Mendoza MD at 7/29/2019  8:30 AM     Author: Thelma Mendoza MD Service: -- Author Type: Physician    Filed: 7/29/2019  9:01 AM Encounter Date: 7/29/2019 Status: Signed    : Thelma Mendoza MD (Physician)       Patient Education     Prostate-Specific Antigen (PSA)  Does this test have other names?  PSA  What is this test?  This test measures the level of prostate-specific antigen (PSA) in your blood.  The cells of the prostate gland make the protein called PSA. Men normally have low levels of PSA. If your PSA levels start to rise, it could mean you have prostate cancer, benign prostate conditions, inflammation, or an infection.  PSA testing is controversial because the U.S. Preventative Services Task Force discourages men who don't have any symptoms of prostate cancer from being screened. The task force says that PSA test results can lead to treating small cancers that would never become life-threatening.  But the American Cancer Society believes men should be told the risks and benefits of PSA testing and allowed to make their own decision about if and when to be screened.  The American Urologic Society says that PSA screening is most important between the ages of 55 and 69. If you have a brother or father with prostate cancer or you are , you should start testing at age 40.   Why do I need this test?  You may have this test if you are 50 or older and your healthcare provider wants to screen you for prostate cancer. Some providers recommend screening at age 40 or 45 if you have a family history of prostate cancer or other risk factors.  You may also have this test if you have already been diagnosed with prostate cancer, so that your healthcare provider can monitor your treatment and see whether your cancer has come back.  What other tests might I have along with this test?  Your healthcare provider may also do a digital rectal exam (MARY). A MARY  is a physical exam of the prostate, not a lab test. For the exam, your provider will place a gloved finger in your rectum and feel the prostate to check for any bumps or abnormal areas. The FDA recommends that a MARY be done along with a PSA test.  What do my test results mean?  Many things may affect your lab test results. These include the method each lab uses to do the test. Even if your test results are different from the normal value, you may not have a problem. To learn what the results mean for you, talk with your healthcare provider.  Results are given in nanograms per milliliter ng/mL. Normal results are below 4.0 ng/mL. A rising PSA may mean that you have cancer. But the PSA results alone won't tell your healthcare provider whether it's cancer or a benign prostate condition. If your provider suspects cancer, he or she will likely suggest that you have a biopsy of the prostate to make the diagnosis.  How is this test done?  The test requires a blood sample, which is drawn through a needle from a vein in your arm.  Does this test pose any risks?  Taking a blood sample with a needle carries risks that include bleeding, infection, bruising, or feeling dizzy. When the needle pricks your arm, you may feel a slight stinging sensation or pain. Afterward, the site may be slightly sore.  What might affect my test results?  An infection can affect your results, as can certain medicines. Riding a bicycle and ejaculation may also affect your results.  How do I get ready for this test?  You may need to abstain from sex and not ride a bicycle within 1 to 2 days of the test. In addition, be sure your healthcare provider knows about all medicines, herbs, vitamins, and supplements you are taking. This includes medicines that don't need a prescription and any illicit drugs you may use.       3684-5984 [employer]. [address] [place] [address], [place] [address]. All rights reserved. This information is not intended as  a substitute for professional medical care. Always follow your healthcare professional's instructions.

## 2021-06-18 NOTE — LETTER
Letter by Thelma Mendoza MD at      Author: Thelma Mendoza MD Service: -- Author Type: --    Filed:  Encounter Date: 2/22/2019 Status: (Other)               [address]  [place] [address]   PHONE [phone]  [phone]     February 22, 2019  Rene Guzman  [address] [place] [address]    Dear Rene:    Below are the results from your recent visit.  Your results are ok.  Your a1c went up from 7 to 7.3      Resulted Orders   Glycosylated Hemoglobin A1c   Result Value Ref Range    Hemoglobin A1c 7.3 (H) 3.5 - 6.0 %         If you have any questions or concerns, please do not hesitate to call.    Sincerely,      Thelma Mendoza MD  February 22, 2019

## 2021-06-18 NOTE — PROGRESS NOTES
[facility] Discharge Summary  Patient Name: Rene Guzman  Date: 5/23/2018  Referral Diagnosis: neck pain  Referring provider: Thelma Mendoza MD  Visit Diagnosis:   1. Neck pain on right side     2. Neck stiffness     3. Abnormal posture         Goals:  No Data Recorded  Pt will: be independent with HEP within 3 weeks. (Goal Met)  Pt will: improve neck rotation and sidebending by 20% or more for driving and work within 6 weeks. (Not Met)  Pt will: be able to work with 2/10 pain or less within 8 weeks. (Slight improvement)  Pt will: decrease NDI by 5 points or more to demonstrate improved function within 8 weeks. (Not assessed)    Patient was seen for 5 visits from initial evaluation to 4/27/18 with 0 missed appointments.  The patient met goals and has demonstrated understanding of and independence in the home program for self-care, and progression to next steps.  He will initiate contact if questions or concerns arise.  Patient received a home program   The patient discontinued therapy, did not return.  No further therapy is required at this time.  See most recent note for more information.    Therapy will be discontinued at this time.  The patient will need a new referral to resume.    Thank you for your referral.  Arben Sebastian, PT, DPT  5/23/2018  11:24 AM

## 2021-06-19 NOTE — PROGRESS NOTES
"S:  64 yo male with diabetes and hyperlipidemia, and hypertension who is here for a recheck of his diabetes, bp, and hyperlipidemia.    He has been checking his blood sugar.  He hasn't been on the treadmill for the past month.  He does not know how to swim, and doesn't enjoy water sports.  [place] but finds that the heat and humidity has really sapped his strength.  When he is out in it, he doesn't feel good if he doesn't drink a lot and then rest.    No cp.  No sobr.    bs fasting are in the 130's -140's.  His lowest was 72, but this was not fasting.  He had been doing a lot of exercise.  He felt that one, and felt kind of nauseated.  He ate something.    He has been taking his glipizide without fail.      He discovered that he was lactose intolerant.  He has tried some lactaid milk, and this improved his sx.  He was always so gassy, and found that he did better off regular milk.      fam hx:  Sister musa had breast cancer, and now it is back.    O:  /68  Pulse 92  Temp 97.7  F (36.5  C) (Axillary)   Resp 20  Ht 5' 5.5\" (1.664 m)  Wt 198 lb (89.8 kg)  BMI 32.45 kg/m2  Gen: no acute distress  Neck:  Supple, no lad, no carotid bruits  Heart:  Regular rate and rhythm.  No m/r/g  Lungs: cta bilaterally, no wheezes or rhonchi.  Good air inspiration  Abdomen:  No masses or organomegaly  Extremities:  No edema.           Patient Active Problem List   Diagnosis     Heartburn     Acute Serous Otitis Media     Joint Pain, Localized In The Knee     Synovial Cyst Of Popliteal Space     Hypertension     Seborrheic Keratosis     Abdominal Pain     Hyperlipidemia     Joint Pain, Localized In The Shoulder     Type 2 diabetes mellitus (H)     Tubular adenoma of colon     Current Outpatient Prescriptions on File Prior to Visit   Medication Sig Dispense Refill     aspirin (ASPIR-LOW) 81 MG EC tablet Take 1 tablet (81 mg total) by mouth daily.       atorvastatin (LIPITOR) 40 MG " tablet TAKE ONE TABLET BY MOUTH ONE TIME DAILY 90 tablet 4     glipiZIDE (GLUCOTROL) 5 MG tablet Take 5mg in the am and 7.5 mg in the pm 90 tablet 12     glipiZIDE (GLUCOTROL) 5 MG tablet TAKE 1 TABLET BY MOUTH TWICE DAILY 1/2 HOUR BEFORE MEALS 180 tablet 10     lisinopril (PRINIVIL,ZESTRIL) 20 MG tablet Take 1 tablet (20 mg total) by mouth daily. 90 tablet 4     metFORMIN (GLUCOPHAGE-XR) 750 MG 24 hr tablet TAKE 2 TABLETS BY MOUTH ONCE DAILY WITH BREAKFAST 60 tablet 12     omeprazole (PRILOSEC) 20 MG capsule TAKE ONE CAPSULE BY MOUTH ONE TIME DAILY 90 capsule 3     ACCU-CHEK ANGELA PLUS METER Misc        ACCU-CHEK SOFTCLIX LANCETS lancets        blood glucose meter (GLUCOMETER) Use 1 each As Directed as needed. Dispense glucometer brand per patient's insurance at pharmacy discretion. 1 each 0     blood glucose test strips Use 1 each As Directed 2 (two) times a day. Dispense brand per patient's insurance at pharmacy discretion. 250 strip 12     generic lancets (MICROLET LANCET) Dispense brand per patient's insurance at pharmacy discretion. 200 each 11     No current facility-administered medications on file prior to visit.           Recent Results (from the past 48 hour(s))   Glycosylated Hemoglobin A1c    Collection Time: 07/16/18  4:04 PM   Result Value Ref Range    Hemoglobin A1c 7.1 (H) 3.5 - 6.0 %         Assessment/Plan:  1. Type 2 diabetes mellitus (H)  Stable at this time.  I did encourage him to get back to exercising regularly.  We discussed the signs and symptoms of hypoglycemia.  If he develops any of these he is to eat something immediately.  We discussed the importance of maintaining hydration given his medications both for his high blood pressure as well as his diabetes.  If his a.m. blood sugars are slowly increasing into the 150s and 160s consistently then he is to increase his glipizide in the evenings to 7.5 mg p.o. and keep his a.m. around 5.  I did continue to encourage him to make diet changes.   Recheck in 4 months.  - Glycosylated Hemoglobin A1c  - Comprehensive Metabolic Panel  - Microalbumin, Random Urine  - Microalbumin, Random Urine    2. Hypertension  Stable.  Continue with current meds.    - Glycosylated Hemoglobin A1c  - Comprehensive Metabolic Panel    3. Hypercholesterolemia  Stable.    - Glycosylated Hemoglobin A1c  - Comprehensive Metabolic Panel          Thelma Mendoza   7/16/2018 4:12 PM

## 2021-06-20 NOTE — LETTER
Letter by Thelma Mendoza MD at      Author: Thelma Mendoza MD Service: -- Author Type: --    Filed:  Encounter Date: 7/14/2020 Status: (Other)               [facility]  [address]  [place] [address]   PHONE [phone]  [phone]     July 14, 2020  Rene MARY Guzman  [address] [place] [address]    Dear Rene:    Below are the results from your recent visit.  Your results show an elevated a1c again.  I think the increased metformin is a good idea.  We will need to increase it again if you tolerate this higher dose.      Resulted Orders   Glycosylated Hemoglobin A1c   Result Value Ref Range    Hemoglobin A1c 8.2 (H) 3.5 - 6.0 %   Comprehensive Metabolic Panel   Result Value Ref Range    Sodium 140 136 - 145 mmol/L    Potassium 4.4 3.5 - 5.0 mmol/L    Chloride 104 98 - 107 mmol/L    CO2 27 22 - 31 mmol/L    Anion Gap, Calculation 9 5 - 18 mmol/L    Glucose 92 70 - 125 mg/dL    BUN 16 8 - 22 mg/dL    Creatinine 1.08 0.70 - 1.30 mg/dL    GFR MDRD Af Amer >60 >60 mL/min/1.73m2    GFR MDRD Non Af Amer >60 >60 mL/min/1.73m2    Bilirubin, Total 0.5 0.0 - 1.0 mg/dL    Calcium 9.8 8.5 - 10.5 mg/dL    Protein, Total 7.1 6.0 - 8.0 g/dL    Albumin 4.2 3.5 - 5.0 g/dL    Alkaline Phosphatase 79 45 - 120 U/L    AST 13 0 - 40 U/L    ALT 23 0 - 45 U/L    Narrative    Fasting Glucose reference range is 70-99 mg/dL per  American Diabetes Association (ADA) guidelines.   Lipid Cascade   Result Value Ref Range    Cholesterol 190 <=199 mg/dL    Triglycerides 198 (H) <=149 mg/dL    HDL Cholesterol 44 >=40 mg/dL    LDL Calculated 106 <=129 mg/dL    Patient Fasting > 8hrs? Unknown          If you have any questions or concerns, please do not hesitate to call.    Sincerely,      Thelma Mendoza MD  July 14, 2020

## 2021-06-21 NOTE — PROGRESS NOTES
"S:  62 yo who is here for a diabetes check.    He has not been exercising due to heel pain.  He got some new orthotics for his plantar fasciitis.  These are helping him a lot.    His BS have been ok.    He continues to work full-time.  He continues to work on making modifications to his diet.  Reviewed the risks and benefits of Viagra.  He will use the lowest dose possible.  I did encourage ongoing    Soc hx:  He still works fulltime.    Ros:  No cp or sobr.  No n/t/w.  He feels really good in general.  No bowel or bladder problems.  His ganglion cyst is bothering him on his right wrist.  He is suffering from erectile dysfunction.  He has trouble getting and maintaining an erection . This has worsened over the past few months.     fam hx:  Mom  of pancreatic cancer in .     O:  /86   Pulse 90   Temp 98.1  F (36.7  C) (Oral)   Resp 20   Ht 5' 5.5\" (1.664 m)   Wt 198 lb (89.8 kg)   SpO2 98%   BMI 32.45 kg/m    Gen: no acute distress  Neck:  Supple, no lad, no carotid bruits  Heart:  Regular rate and rhythm.  No m/r/g  Lungs: cta bilaterally, no wheezes or rhonchi.  Good air inspiration  Abdomen:  No masses or organomegaly, soft.  Obese.  nontender . Non distended.    Extremities:  No edema.           Patient Active Problem List   Diagnosis     Heartburn     Acute Serous Otitis Media     Joint Pain, Localized In The Knee     Synovial Cyst Of Popliteal Space     Hypertension     Seborrheic Keratosis     Abdominal Pain     Hyperlipidemia     Joint Pain, Localized In The Shoulder     Type 2 diabetes mellitus (H)     Tubular adenoma of colon     Current Outpatient Medications on File Prior to Visit   Medication Sig Dispense Refill     aspirin (ASPIR-LOW) 81 MG EC tablet Take 1 tablet (81 mg total) by mouth daily.       atorvastatin (LIPITOR) 40 MG tablet TAKE ONE TABLET BY MOUTH ONE TIME DAILY 90 tablet 4     glipiZIDE (GLUCOTROL) 5 MG tablet Take 5mg in the am and 7.5 mg in the pm 90 tablet " 12     glipiZIDE (GLUCOTROL) 5 MG tablet TAKE 1 TABLET BY MOUTH TWICE DAILY 1/2 HOUR BEFORE MEALS 180 tablet 10     lisinopril (PRINIVIL,ZESTRIL) 20 MG tablet Take 1 tablet (20 mg total) by mouth daily. 90 tablet 4     metFORMIN (GLUCOPHAGE-XR) 750 MG 24 hr tablet TAKE 2 TABLETS BY MOUTH ONCE DAILY WITH BREAKFAST 60 tablet 12     omeprazole (PRILOSEC) 20 MG capsule TAKE ONE CAPSULE BY MOUTH ONE TIME DAILY 90 capsule 3     ACCU-CHEK ANGELA PLUS METER Misc        ACCU-CHEK SOFTCLIX LANCETS lancets        blood glucose meter (GLUCOMETER) Use 1 each As Directed as needed. Dispense glucometer brand per patient's insurance at pharmacy discretion. 1 each 0     blood glucose test strips Use 1 each As Directed 2 (two) times a day. Dispense brand per patient's insurance at pharmacy discretion. 250 strip 12     generic lancets (MICROLET LANCET) Dispense brand per patient's insurance at pharmacy discretion. 200 each 11     omeprazole (PRILOSEC) 20 MG capsule TAKE ONE CAPSULE BY MOUTH ONE TIME DAILY 90 capsule 3     No current facility-administered medications on file prior to visit.           Recent Results (from the past 48 hour(s))   Hemoglobin A1c    Collection Time: 11/19/18  4:31 PM   Result Value Ref Range    Hemoglobin A1c 7.0 (H) 3.5 - 6.0 %         Assessment/Plan:  1. Erectile dysfunction, unspecified erectile dysfunction type  We reviewed the risks and benefits of Viagra.  He will use the lowest dose possible that is effective.  I encouraged ongoing control of both diabetes and hypertension.  He will contact me with any problems with this medication.  - sildenafil (VIAGRA) 100 MG tablet; Take 1 tablet (100 mg total) by mouth as needed for erectile dysfunction.  Dispense: 20 tablet; Refill: 12    2. Type 2 diabetes mellitus with complication, without long-term current use of insulin (H)  Well-controlled on current medications.  Continue with these.  Increase activity.  Recheck in 3 months or as needed.  - Comprehensive  Metabolic Panel  - Hemoglobin A1c          Thelma Mendoza   11/19/2018 4:09 PM

## 2021-06-21 NOTE — LETTER
Letter by Thelma Mendoza MD at      Author: Thelma Mendoza MD Service: -- Author Type: --    Filed:  Encounter Date: 1/19/2021 Status: (Other)               [facility]  [address]  [place] [address]   PHONE [phone]  [phone]     January 19, 2021  Rene Guzman  [address] [place] [address]    Dear Rene:    Below are the results from your recent visit.  Your results show that your kidney function is worsening a little bit from last year.  I think that helping to control your blood sugar will hopefully help with this.      Resulted Orders   Microalbumin, Random Urine   Result Value Ref Range    Microalbumin, Random Urine 5.26 (H) 0.00 - 1.99 mg/dL    Creatinine, Urine 447.6 mg/dL    Microalbumin/Creatinine Ratio Random Urine 11.8 <=19.9 mg/g    Narrative    Microalbumin, Random Urine  <2.0 mg/dL . . . . . . . . Normal  3.0-30.0 mg/dL . . . . . . Microalbuminuria  >30.0 mg/dL . . . . . .  . Clinical Proteinuria    Microalbumin/Creatinine Ratio, Random Urine  <20 mg/g . . . . .. . . . Normal   mg/g . . . . . . . Microalbuminuria  >300 mg/g . . . . . . . . Clinical Proteinuria             If you have any questions or concerns, please do not hesitate to call.    Sincerely,      Thelma Mendoza MD  January 19, 2021

## 2021-06-21 NOTE — PROGRESS NOTES
"S:  62 yo male who is here in follow up of back pain.  He started having some stabbing pain in her right lower back on Monday am.  He helped his son move his air conditioner out.  He also went bowling on Sunday night.  He was seen in the walk in clinic on Monday and was given a toradol shot, which really helped.  He has been taking advil as well.    His pain has improved since that time.  No n/t/w in any part of his body.  Sensation is intact in his lower extremities.  No bowel or bladder bladder problems.  He has gone back to work.    O:  /84  Pulse 84  Temp 97.6  F (36.4  C) (Oral)   Resp 20  Ht 5' 5.5\" (1.664 m)  Wt 200 lb (90.7 kg)  SpO2 98%  BMI 32.78 kg/m2  Gen:  Nad, alert  Ms: Tender along the paraspinal muscles in the lumbar muscles of the right back.  Palpation of these reproduces his back pain.  These muscles are quite tight and asymmetric when compared to the left.    No weakness  In lower extremities.  DTR are symmetric bilaterally.  Strength is 5/5 in lower extremities.    abd soft.  Non tender.  Non distended.      Patient Active Problem List   Diagnosis     Heartburn     Acute Serous Otitis Media     Joint Pain, Localized In The Knee     Synovial Cyst Of Popliteal Space     Hypertension     Seborrheic Keratosis     Abdominal Pain     Hyperlipidemia     Joint Pain, Localized In The Shoulder     Type 2 diabetes mellitus (H)     Tubular adenoma of colon     Current Outpatient Prescriptions on File Prior to Visit   Medication Sig Dispense Refill     aspirin (ASPIR-LOW) 81 MG EC tablet Take 1 tablet (81 mg total) by mouth daily.       atorvastatin (LIPITOR) 40 MG tablet TAKE ONE TABLET BY MOUTH ONE TIME DAILY 90 tablet 4     glipiZIDE (GLUCOTROL) 5 MG tablet Take 5mg in the am and 7.5 mg in the pm 90 tablet 12     lisinopril (PRINIVIL,ZESTRIL) 20 MG tablet Take 1 tablet (20 mg total) by mouth daily. 90 tablet 4     metFORMIN (GLUCOPHAGE-XR) 750 MG 24 hr tablet TAKE 2 TABLETS BY MOUTH ONCE " DAILY WITH BREAKFAST 60 tablet 12     omeprazole (PRILOSEC) 20 MG capsule TAKE ONE CAPSULE BY MOUTH ONE TIME DAILY 90 capsule 3     ACCU-CHEK ANGELA PLUS METER Misc        ACCU-CHEK SOFTCLIX LANCETS lancets        blood glucose meter (GLUCOMETER) Use 1 each As Directed as needed. Dispense glucometer brand per patient's insurance at pharmacy discretion. 1 each 0     blood glucose test strips Use 1 each As Directed 2 (two) times a day. Dispense brand per patient's insurance at pharmacy discretion. 250 strip 12     generic lancets (MICROLET LANCET) Dispense brand per patient's insurance at pharmacy discretion. 200 each 11     glipiZIDE (GLUCOTROL) 5 MG tablet TAKE 1 TABLET BY MOUTH TWICE DAILY 1/2 HOUR BEFORE MEALS 180 tablet 10     omeprazole (PRILOSEC) 20 MG capsule TAKE ONE CAPSULE BY MOUTH ONE TIME DAILY 90 capsule 3     No current facility-administered medications on file prior to visit.           No results found for this or any previous visit (from the past 48 hour(s)).      Assessment/Plan:  1. Back pain  Exercises given.  Work on stretching and massage to prevent this from becoming chronic  Reviewed way to strengthen his core muscles to prevent this from happening again.    D/c nsaids unless he needs them.             Thelma Mendoza   10/18/2018 11:18 AM

## 2021-06-24 NOTE — TELEPHONE ENCOUNTER
Refill Approved    Rx renewed per Medication Renewal Policy. Medication was last renewed on 12/14/17.    Eden Bartlett, [facility] Triage/Med Refill 2/23/2019     Requested Prescriptions   Pending Prescriptions Disp Refills     atorvastatin (LIPITOR) 40 MG tablet [Pharmacy Med Name: ATORVASTATIN 40 MG TABLET] 90 tablet 0     Sig: TAKE ONE TABLET BY MOUTH ONE TIME DAILY    Statins Refill Protocol (Hmg CoA Reductase Inhibitors) Passed - 2/23/2019 12:18 AM       Passed - PCP or prescribing provider visit in past 12 months     Last office visit with prescriber/PCP: 2/21/2019 Thelma Mendoza MD OR same dept: 2/21/2019 Thelma Mendoza MD OR same specialty: 2/21/2019 Thelma Mendoza MD  Last physical: Visit date not found Last MTM visit: Visit date not found   Next visit within 3 mo: Visit date not found  Next physical within 3 mo: Visit date not found  Prescriber OR PCP: Thelma Mendoza MD  Last diagnosis associated with med order: 1. Diabetes mellitus (H)  - atorvastatin (LIPITOR) 40 MG tablet [Pharmacy Med Name: ATORVASTATIN 40 MG TABLET]; TAKE ONE TABLET BY MOUTH ONE TIME DAILY  Dispense: 90 tablet; Refill: 0    2. Hypercholesterolemia  - atorvastatin (LIPITOR) 40 MG tablet [Pharmacy Med Name: ATORVASTATIN 40 MG TABLET]; TAKE ONE TABLET BY MOUTH ONE TIME DAILY  Dispense: 90 tablet; Refill: 0    If protocol passes may refill for 12 months if within 3 months of last provider visit (or a total of 15 months).

## 2021-06-24 NOTE — PROGRESS NOTES
"S:  63-year-old male who comes in today for follow-up of his  #1 type 2 diabetes.  He says that he start checking his blood sugar every morning.  He ranges from 110-148.  It really depends on what he eats in the evenings.  He has found that decreasing his portion sizes decreases his blood sugar in the morning.  He remains active through the day.  He continues to work he denies any low blood sugars.  He denies any chest pain, shortness of breath, numbness, tingling, weakness in any one part of his body.  He is not getting on his treadmill as much as he has in the past.  He has been doing a lot of shoveling and outdoor work.    He is having multiple episodes of diarrhea in the am . This has increased since hs increased his dose of metformin.  No blood in stool.  He is now going 1-3 times in the am.  No matter if he takes his pills with food or not.    He is currently taking glipizide 5 mg p.o. twice daily.  In addition to his 1500 of metformin daily.    #2 erectile dysfunction.  He did not get his Viagra due to cost.    Ros:  Negative for new problems.    fam hx:  Sister musa with recurrent breast cancer.      O:  /70   Pulse (!) 110   Temp 98.2  F (36.8  C) (Oral)   Resp 16   Ht 5' 5.5\" (1.664 m)   Wt 198 lb (89.8 kg)   SpO2 95%   BMI 32.45 kg/m    Gen: no acute distress  Neck:  Supple, no lad, no carotid bruits  Heart:  Regular rate and rhythm.  No m/r/g  Lungs: cta bilaterally, no wheezes or rhonchi.  Good air inspiration  Abdomen:  No masses or organomegaly  Extremities:  No edema.           Ext:  Normal foot exam, good hair growth.  Sensation intact to monofilament.  Normal DP pulses bilaterally.  No ulcerations or callus noted.    Patient Active Problem List   Diagnosis     Heartburn     Acute Serous Otitis Media     Joint Pain, Localized In The Knee     Synovial Cyst Of Popliteal Space     Hypertension     Seborrheic Keratosis     Abdominal Pain     Hyperlipidemia     Joint Pain, Localized In The " Shoulder     Type 2 diabetes mellitus (H)     Tubular adenoma of colon     Current Outpatient Medications on File Prior to Visit   Medication Sig Dispense Refill     ACCU-CHEK ANGELA PLUS METER Misc        ACCU-CHEK SOFTCLIX LANCETS lancets        aspirin (ASPIR-LOW) 81 MG EC tablet Take 1 tablet (81 mg total) by mouth daily.       atorvastatin (LIPITOR) 40 MG tablet TAKE ONE TABLET BY MOUTH ONE TIME DAILY 90 tablet 4     blood glucose meter (GLUCOMETER) Use 1 each As Directed as needed. Dispense glucometer brand per patient's insurance at pharmacy discretion. 1 each 0     blood glucose test strips Use 1 each As Directed 2 (two) times a day. Dispense brand per patient's insurance at pharmacy discretion. 250 strip 12     generic lancets (MICROLET LANCET) Dispense brand per patient's insurance at pharmacy discretion. 200 each 11     glipiZIDE (GLUCOTROL) 5 MG tablet Take 5mg in the am and 7.5 mg in the pm 90 tablet 12     glipiZIDE (GLUCOTROL) 5 MG tablet TAKE 1 TABLET BY MOUTH TWICE DAILY 1/2 HOUR BEFORE MEALS 180 tablet 10     lisinopril (PRINIVIL,ZESTRIL) 20 MG tablet Take 1 tablet (20 mg total) by mouth daily. 90 tablet 4     metFORMIN (GLUCOPHAGE-XR) 750 MG 24 hr tablet TAKE 2 TABLETS BY MOUTH ONCE DAILY WITH BREAKFAST 60 tablet 12     omeprazole (PRILOSEC) 20 MG capsule TAKE ONE CAPSULE BY MOUTH ONE TIME DAILY 90 capsule 3     omeprazole (PRILOSEC) 20 MG capsule TAKE ONE CAPSULE BY MOUTH ONE TIME DAILY 90 capsule 3     [DISCONTINUED] sildenafil (VIAGRA) 100 MG tablet Take 1 tablet (100 mg total) by mouth as needed for erectile dysfunction. 20 tablet 12     No current facility-administered medications on file prior to visit.           No results found for this or any previous visit (from the past 48 hour(s)).      Assessment/Plan:  1. Erectile dysfunction, unspecified erectile dysfunction type  We will try giving him a smaller number of pills to make sure that he is able to afford this medication.  We also  discussed cutting the tablets to make them last longer as long as that dose works for him.  - sildenafil (VIAGRA) 100 MG tablet; Take 1 tablet (100 mg total) by mouth as needed for erectile dysfunction.  Dispense: 10 tablet; Refill: 12    2. Type 2 diabetes mellitus with complication, without long-term current use of insulin (H)  He will try decreasing his metformin to 750 mg p.o. daily.  If his diarrhea does not resolve with this he may need to take out the metformin completely.  I have asked him to keep an eye on his blood sugars with changing his dose of metformin.  If his blood sugars increase substantially and he is unable to control him with activity and diet, then he may need to increase his glipizide to 10 mg p.o. twice daily.  We did discuss the risk of low blood sugars with an increase in glipizide.  He will call me and let me know how he does with these changes.  If his diarrhea persists we may need to take him off metformin completely and add a GLP to his regimen.  - Glycosylated Hemoglobin A1c      shingrix vaccine given today.    Recheck in 4-6 months or prn.       Thelma Mendoza   2/21/2019 4:45 PM

## 2021-06-24 NOTE — TELEPHONE ENCOUNTER
RN cannot approve Refill Request    RN can NOT refill this medication Protocol failed and RN gave three month refill.  Last renewed 12/14/2017 for 90/4.     Last office visit: 2/21/2019 Thelma Mendoza MD Last Physical: Visit date not found Last MTM visit: Visit date not found Last visit same specialty: 2/21/2019 Thelma Mendoza MD.  Next visit within 3 mo: Visit date not found  Next physical within 3 mo: Visit date not found      Shahla Alves, [facility] Triage/Med Refill 2/27/2019    Requested Prescriptions   Pending Prescriptions Disp Refills     lisinopril (PRINIVIL,ZESTRIL) 20 MG tablet [Pharmacy Med Name: LISINOPRIL 20 MG TABLET] 90 tablet 4     Sig: TAKE 1 TABLET (20 MG TOTAL) BY MOUTH DAILY.    Ace Inhibitors Refill Protocol Passed - 2/27/2019  1:08 AM       Passed - PCP or prescribing provider visit in past 12 months      Last office visit with prescriber/PCP: 2/21/2019 Thelma Mendoza MD OR same dept: 2/21/2019 Thelma Mendoza MD OR same specialty: 2/21/2019 Thelma Mendoza MD  Last physical: Visit date not found Last MTM visit: Visit date not found   Next visit within 3 mo: Visit date not found  Next physical within 3 mo: Visit date not found  Prescriber OR PCP: Thelma Mendoza MD  Last diagnosis associated with med order: 1. Diabetes mellitus (H)  - lisinopril (PRINIVIL,ZESTRIL) 20 MG tablet [Pharmacy Med Name: LISINOPRIL 20 MG TABLET]; Take 1 tablet (20 mg total) by mouth daily.  Dispense: 90 tablet; Refill: 4    If protocol passes may refill for 12 months if within 3 months of last provider visit (or a total of 15 months).            Passed - Serum Potassium in past 12 months    Lab Results   Component Value Date    Potassium 4.8 11/19/2018            Passed - Blood pressure filed in past 12 months    BP Readings from Last 1 Encounters:   02/21/19 136/70            Passed - Serum Creatinine in past 12 months    Creatinine   Date Value Ref Range Status    11/19/2018 0.84 0.70 - 1.30 mg/dL Final

## 2021-06-26 NOTE — PROGRESS NOTES
Progress Notes by Inez Estrada CNP at 10/15/2018  4:00 PM     Author: Inez Estrada CNP Service: -- Author Type: Nurse Practitioner    Filed: 10/15/2018  5:03 PM Encounter Date: 10/15/2018 Status: Signed    : Inez Estrada CNP (Nurse Practitioner)       Subjective:      Patient ID: Rene Guzman is a 63 y.o. male.    Chief Complaint:   Chief Complaint   Patient presents with   ? Back Pain     lower right side since this a.m.         HPI : Did some heavy lifting yesterday - helped son move window AC which wouldn't be very unusual. Woke up with rt sided lower back pain.  Worse with change in positions, turning.  No radiation.  No weakness, N/T.    Works as a , pain with twisting or any lifting.      Currently a non-smoker.  Last smoked 30 years ago.  Oxygen level is low somewhat today per initial vital signs.      No past medical history on file.    Past Surgical History:   Procedure Laterality Date   ? COLONOSCOPY W/ BIOPSIES  11/07/2016    2 adenomatous   ? [facility] CORRJ HALLUX VALGUS W/SESMDC W/DIST METAR OSTEOT      Description: Bunion Correction With Metatarsal Osteotomy Est[facility]ardo Procedure;  Proc Date: 03/28/2007;  Comments: Right great toe repair by Dr. Orantes.   ? [place] LAP,CHOLECYSTECTOMY      Description: Cholecystectomy Laparoscopic;  Proc Date: 04/18/2000;  Comments: At [facility].       Family History   Problem Relation Age of Onset   ? Diabetes Grandchild      type 1 diabetes   ? Cancer Father 60     small bowel carcinoid syndrome.     ? Heart disease Father    ? Breast cancer Sister    ? Cancer Brother 56     kidney       Social History   Substance Use Topics   ? Smoking status: Former Smoker   ? Smokeless tobacco: Never Used   ? Alcohol use None       Review of Systems   Constitutional: Negative for chills and fever.   Respiratory: Positive for shortness of breath (mild ). Negative for cough and chest tightness.         Some increased pain with deep breathing.         Objective:      /70 (Patient Site: Right Arm, Patient Position: Sitting, Cuff Size: Adult Regular)  Pulse 90  Temp 98.1  F (36.7  C) (Oral)   Resp 16  Wt 202 lb 11.2 oz (91.9 kg)  SpO2 98%  BMI 33.22 kg/m2    Physical Exam   Constitutional: He is oriented to person, place, and time. He appears well-developed and well-nourished.   Eyes: Conjunctivae are normal.   Cardiovascular: Intact distal pulses.    Pulmonary/Chest: Effort normal and breath sounds normal.   Musculoskeletal: He exhibits tenderness (Right lower lumbar area.  Worse with twisting and going from sitting to standing.).   Normal gait    Neurological: He is alert and oriented to person, place, and time.   Normal lower extremity strengths   Skin: Skin is warm and dry.   Psychiatric: He has a normal mood and affect. His behavior is normal. Judgment and thought content normal.       Assessment:     Procedures    The encounter diagnosis was Strain of lumbar region, initial encounter.    Plan:     Diagnoses and all orders for this visit:    Strain of lumbar region, initial encounter  -     ketorolac injection 30 mg (TORADOL); Inject 1 mL (30 mg total) into the shoulder, thigh, or buttocks once.  -     Nursing communication      Initial O2 sat was 92%.  When I rechecked it was 98%.  Patient's exam is more consistent with a muscle strain then PE even though pain brushes up against the left lower portion of the posterior rib cage.    Patient denies intolerance or allergy to ibuprofen.  Recommended starting naproxen tomorrow morning.    No work for 2 full days and then recheck in [facility] on Thursday at 1115 -appointment scheduled.  Avoid heavy lifting.  Get up to walk around periodically to prevent muscle spasm.  Ice packs discussed.    At the end of the encounter, I discussed results, diagnosis, medications. Discussed red flags for immediate return to clinic/ER, as well as indications for follow up if no improvement. Patient and/or caregiver  understood  and agreed to plan. Patient was stable for discharge.      Patient Instructions   Naproxen/Aleve 2 tablets every 12 hours for pain.    Tylenol 2 extra strength tablets every 6 hours as needed   Ice pack for 20 minutes at least 4 times daily for the next 2 days.  Recheck in your clinic on Thursday.        Back Sprain or Strain    Injury to the muscles (strain) or ligaments (sprain) around the spine can be troubling. Injury may occur after a sudden forceful twisting or bending force such as in a car accident, after a simple awkward movement, or after lifting something heavy with poor body positioning. In any case, muscle spasm is often present and adds to the pain.  Thankfully, most people feel better in 1 to 2 weeks, and most of the rest in 1 to 2 months. Most people can remain active. Unless you had a forceful or traumatic physical injury such as a car accident or fall, X-rays may not be ordered for the first evaluation of a back sprain or strain. If pain continues and does not respond to medical treatment, your healthcare provider may then order X-rays and other tests.  Home care  The following guidelines will help you care for your injury at home:    When in bed, try to find a comfortable position. A firm mattress is best. Try lying flat on your back with pillows under your knees. You can also try lying on your side with your knees bent up toward your chest and a pillow between your knees.    Don't sit for long periods. Try not to take long car rides or take other trips that have you sitting for a long time. This puts more stress on the lower back than standing or walking.    During the first 24 to 72 hours after an injury or flare-up, apply an ice pack to the painful area for 20 minutes. Then remove it for 20 minutes. Do this for 60 to 90 minutes, or several times a day. This will reduce swelling and pain. Be sure to wrap the ice pack in a thin towel or plastic to protect your skin.    You can start with ice,  then switch to heat. Heat from a hot shower, hot bath, or heating pad reduces pain and works well for muscle spasms. Put heat on the painful area for 20 minutes, then remove for 20 minutes. Do this for 60 to 90 minutes, or several times a day. Do not use a heating pad while sleeping. It can burn the skin.    You can alternate the ice and heat. Talk with your healthcare provider to find out the best treatment or therapy for your back pain.    Therapeutic massage will help relax the back muscles without stretching them.    Be aware of safe lifting methods. Do not lift anything over 15 pounds until all of the pain is gone.  Medicines  Talk to your healthcare provider before using medicines, especially if you have other health problems or are taking other medicines.    You may use acetaminophen or ibuprofen to control pain, unless another pain medicine was prescribed. If you have chronic conditions like diabetes, liver or kidney disease, stomach ulcers, or gastrointestinal bleeding, or are taking blood-thinner medicines, talk with your doctor before taking any medicines.    Be careful if you are given prescription medicines, narcotics, or medicine for muscle spasm. They can cause drowsiness, and affect your coordination, reflexes, and judgment. Do not drive or operate heavy machinery when taking these types of medicines. Only take pain medicine as prescribed by your healthcare provider.  Follow-up care  Follow up with your healthcare provider, or as advised. You may need physical therapy or more tests if your symptoms get worse.  If you had X-rays your healthcare provider may be checking for any broken bones, breaks, or fractures. Bruises and sprains can sometimes hurt as much as a fracture. These injuries can take time to heal completely. If your symptoms dont improve or they get worse, talk with your healthcare provider. You may need a repeat X-ray or other tests.  Call 911  Call 911 if any of the following  occur:    Trouble breathing    Confused    Very drowsy or trouble awakening    Fainting or loss of consciousness    Rapid or very slow heart rate    Loss of bowel or bladder control  When to seek medical advice  Call your healthcare provider right away if any of the following occur:    Pain gets worse or spreads to your arms or legs    Weakness or numbness in one or both arms or legs    Numbness in the groin or genital area  Date Last Reviewed: 6/1/2016 2000-2017 [employer]. [address] [place] [address]. All rights reserved. This information is not intended as a substitute for professional medical care. Always follow your healthcare professional's instructions.

## 2021-06-28 NOTE — PROGRESS NOTES
"Progress Notes by Thelma Mendoza MD at 1/30/2020  8:30 AM     Author: Thelma Mendoza MD Service: -- Author Type: Physician    Filed: 1/30/2020 10:15 AM Encounter Date: 1/30/2020 Status: Signed    : Thelma Mendoza MD (Physician)       S:  Rene Guzman is a 64 y.o. male who comes to the clinic today for  1.  Type 2 diabetes:  In the am his blood sugars are ranging from 120-145.  He is able to see why his blood sugar is high or low.  No cp, sobr, n/t/w in any one part of his body.  No vision changes.  He just went to the eye clinic.  They noticed some mild glaucoma.    He is not exercising regularly.  He feels like once he is retired, he might go on his treadmill more often.    He is doing glipizide 7.5mg in the evening, and 5mg in the am.  He is not having lows very often.    He is not having as much diarrhea with his metformin now.  It is no longer a daily occurrence.  It is more like a weekly episode.  It is in the am.    He is not fasting this am.  He has several pieces of toast this am.        2.  Skin lesion on his back that is itchy.  Has only been bothering him for the past few months.  It is not itchy all the time, only occasionally.  No bleeding.  No history of similar.  He has not put anything on it.       3.  Hypertension:  No problems with meds.  No regular exercise.      4.  Hypercholesterolemia:  Not following a specific diet.      I reviewed the pertinent family, social, surgical, medical history.    His granddaughter is doing well, and has finally learned to tell her parents when her blood sugar is low.  He is still working fulltime, and is on his feet a lot.  He is looking forward to doing more golfing this summer.      O:  /82   Pulse 80   Temp 98  F (36.7  C) (Oral)   Resp 20   Ht 5' 5\" (1.651 m)   Wt 200 lb 8 oz (90.9 kg)   SpO2 98%   BMI 33.36 kg/m    Gen: no acute distress  Neck:  Supple, no lad, no carotid bruits  Heart:  Regular rate and rhythm.  No " m/r/g  Lungs: cta bilaterally, no wheezes or rhonchi.  Good air inspiration  Abdomen:  No masses or organomegaly  Extremities:  No edema.     Skin:  2 areas of erythema with flaking skin over scalp.  One area of 1.5 wide x 2cm tall red area with flaking, and a small area of scab over his back.                    Patient Active Problem List   Diagnosis   ? Heartburn   ? Acute Serous Otitis Media   ? Joint Pain, Localized In The Knee   ? Synovial Cyst Of Popliteal Space   ? Hypertension   ? Seborrheic Keratosis   ? Abdominal Pain   ? Hyperlipidemia   ? Joint Pain, Localized In The Shoulder   ? Type 2 diabetes mellitus (H)   ? Tubular adenoma of colon     Current Outpatient Medications on File Prior to Visit   Medication Sig Dispense Refill   ? ACCU-CHEK ANGELA PLUS METER Misc      ? ACCU-CHEK SOFTCLIX LANCETS lancets      ? aspirin (ASPIR-LOW) 81 MG EC tablet Take 1 tablet (81 mg total) by mouth daily. (Patient taking differently: Take 81 mg by mouth daily. Every other day      )     ? atorvastatin (LIPITOR) 40 MG tablet TAKE ONE TABLET BY MOUTH ONE TIME DAILY 90 tablet 3   ? blood glucose meter (GLUCOMETER) Use 1 each As Directed as needed. Dispense glucometer brand per patient's insurance at pharmacy discretion. 1 each 0   ? blood glucose test strips Use 1 each As Directed 2 (two) times a day. Dispense brand per patient's insurance at pharmacy discretion. 250 strip 12   ? generic lancets (MICROLET LANCET) Dispense brand per patient's insurance at pharmacy discretion. 200 each 11   ? glipiZIDE (GLUCOTROL) 5 MG tablet Take 5mg in the am and 7.5 mg in the pm 90 tablet 12   ? glipiZIDE (GLUCOTROL) 5 MG tablet TAKE 1 TABLET BY MOUTH TWICE DAILY 1/2 HOUR BEFORE MEALS 180 tablet 10   ? metFORMIN (GLUCOPHAGE-XR) 750 MG 24 hr tablet TAKE 2 TABLETS BY MOUTH ONCE DAILY WITH BREAKFAST (Patient taking differently: TAKE 1 TABLETS BY MOUTH ONCE DAILY WITH BREAKFAST) 180 tablet 3   ? omeprazole (PRILOSEC) 20 MG capsule TAKE ONE  CAPSULE BY MOUTH ONE TIME DAILY 90 capsule 3   ? omeprazole (PRILOSEC) 20 MG capsule TAKE ONE CAPSULE BY MOUTH ONE TIME DAILY 90 capsule 3   ? omeprazole (PRILOSEC) 20 MG capsule TAKE ONE CAPSULE BY MOUTH ONE TIME DAILY 90 capsule 2   ? sildenafil (VIAGRA) 100 MG tablet Take 1 tablet (100 mg total) by mouth as needed for erectile dysfunction. 10 tablet 12   ? [DISCONTINUED] lisinopril (PRINIVIL,ZESTRIL) 20 MG tablet TAKE 1 TABLET (20 MG TOTAL) BY MOUTH DAILY. 90 tablet 0     No current facility-administered medications on file prior to visit.           Recent Results (from the past 48 hour(s))   Glycosylated Hemoglobin A1c    Collection Time: 01/30/20  9:22 AM   Result Value Ref Range    Hemoglobin A1c 8.9 (H) 3.5 - 6.0 %        No images are attached to the encounter or orders placed in the encounter.       Assessment/Plan:  1. Type 2 diabetes mellitus with hyperglycemia, without long-term current use of insulin (H)  Encouraged walking, even if just for 10 minutes daily . Needs diet changes.    Will increase glipizide to 10mg in the pm.  Leave at 5mg in the am due to occasional lows.    After 2 weeks, if am blood sugars are still high, will increase metforming to 1000 and see if he tolerates this from a bowel perspective.    - Comprehensive Metabolic Panel  - Glycosylated Hemoglobin A1c    2. Hypertension  Elevated blood pressure.    Increase lisinopril to 40mg po daily  Recheck in 2 weeks.      Increase activity.  Work on weight loss.    - Comprehensive Metabolic Panel    3. Hypercholesterolemia  Stable.    - Comprehensive Metabolic Panel    4. Skin lesion  Punch Biopsy Procedure Note    Pre-operative Diagnosis: Suspicious lesion    Post-operative Diagnosis: normal    Locations:medial low back    Indications: suspicious lesion    Anesthesia: Lidocaine 1% with epinephrine without added sodium bicarbonate    Procedure Details   History of allergy to iodine: no  Patient informed of the risks (including bleeding and  infection) and benefits of the   procedure and Verbal informed consent obtained.    The lesion and surrounding area was given a sterile prep using betadyne and draped in the usual sterile fashion. The skin was then stretched perpendicular to the skin tension lines and the lesion was sampled in 3 areas using the 3mm punch. The wounds were left open to heal by secondary intention. Antibiotic ointment and a sterile dressing applied. The specimens were sent for pathologic examination. The patient tolerated the procedure well.    EBL: 0 ml    Findings:  Suspicious lesion s/p punch bx x 3.     Condition:  Stable    Complications:  none.    Plan:  1. Instructed to keep the wound dry and covered for 24-48h and clean thereafter.  2. Warning signs of infection were reviewed.    3. Recommended that the patient use OTC acetaminophen as needed for pain.     - Ambulatory referral to Dermatology  - Surgical Pathology Exam    5. Actinic keratosis  After permission was obtained, and risks and benefits were discussed, 2 scalp lesions were treated with cryotherapy using 3 freeze thaw cycles.  Pt tolerate the procedure well, and after care was discussed.     6. Diabetes mellitus (H)    - lisinopril (PRINIVIL,ZESTRIL) 40 MG tablet; Take 1 tablet (40 mg total) by mouth daily.  Dispense: 90 tablet; Refill: 3          Thelma Mendoza   1/30/2020 8:36 AM

## 2021-07-03 NOTE — ADDENDUM NOTE
Addendum Note by Thelma Mendoza MD at 7/29/2019  8:30 AM     Author: Thelma Mendoza MD Service: -- Author Type: Physician    Filed: 7/30/2019  6:15 PM Encounter Date: 7/29/2019 Status: Signed    : Thelma Mendoza MD (Physician)    Addended by: THELMA MENDOZA on: 7/30/2019 06:15 PM        Modules accepted: Level of Service

## 2021-07-26 ENCOUNTER — LAB (OUTPATIENT)
Dept: LAB | Facility: CLINIC | Age: 66
End: 2021-07-26
Payer: COMMERCIAL

## 2021-07-26 DIAGNOSIS — E11.65 TYPE 2 DIABETES MELLITUS WITH HYPERGLYCEMIA, WITHOUT LONG-TERM CURRENT USE OF INSULIN (H): ICD-10-CM

## 2021-07-26 LAB — HBA1C MFR BLD: 8.3 % (ref 0–5.6)

## 2021-07-26 PROCEDURE — 36415 COLL VENOUS BLD VENIPUNCTURE: CPT

## 2021-07-26 PROCEDURE — 83036 HEMOGLOBIN GLYCOSYLATED A1C: CPT

## 2021-09-26 ENCOUNTER — HEALTH MAINTENANCE LETTER (OUTPATIENT)
Age: 66
End: 2021-09-26

## 2021-10-19 ENCOUNTER — OFFICE VISIT (OUTPATIENT)
Dept: FAMILY MEDICINE | Facility: CLINIC | Age: 66
End: 2021-10-19
Payer: COMMERCIAL

## 2021-10-19 VITALS
BODY MASS INDEX: 31.32 KG/M2 | WEIGHT: 188 LBS | SYSTOLIC BLOOD PRESSURE: 142 MMHG | OXYGEN SATURATION: 98 % | TEMPERATURE: 98.4 F | HEIGHT: 65 IN | HEART RATE: 96 BPM | DIASTOLIC BLOOD PRESSURE: 86 MMHG

## 2021-10-19 DIAGNOSIS — E11.65 TYPE 2 DIABETES MELLITUS WITH HYPERGLYCEMIA, WITHOUT LONG-TERM CURRENT USE OF INSULIN (H): Primary | ICD-10-CM

## 2021-10-19 DIAGNOSIS — E78.00 HYPERCHOLESTEROLEMIA: ICD-10-CM

## 2021-10-19 DIAGNOSIS — I10 ESSENTIAL HYPERTENSION: ICD-10-CM

## 2021-10-19 LAB
ALBUMIN SERPL-MCNC: 4.3 G/DL (ref 3.5–5)
ALP SERPL-CCNC: 76 U/L (ref 45–120)
ALT SERPL W P-5'-P-CCNC: 23 U/L (ref 0–45)
ANION GAP SERPL CALCULATED.3IONS-SCNC: 11 MMOL/L (ref 5–18)
AST SERPL W P-5'-P-CCNC: 14 U/L (ref 0–40)
BILIRUB SERPL-MCNC: 0.6 MG/DL (ref 0–1)
BUN SERPL-MCNC: 12 MG/DL (ref 8–22)
CALCIUM SERPL-MCNC: 10 MG/DL (ref 8.5–10.5)
CHLORIDE BLD-SCNC: 105 MMOL/L (ref 98–107)
CO2 SERPL-SCNC: 24 MMOL/L (ref 22–31)
CREAT SERPL-MCNC: 0.97 MG/DL (ref 0.7–1.3)
GFR SERPL CREATININE-BSD FRML MDRD: 82 ML/MIN/1.73M2
GLUCOSE BLD-MCNC: 94 MG/DL (ref 70–125)
HBA1C MFR BLD: 8 % (ref 0–5.6)
POTASSIUM BLD-SCNC: 4.2 MMOL/L (ref 3.5–5)
PROT SERPL-MCNC: 7.3 G/DL (ref 6–8)
SODIUM SERPL-SCNC: 140 MMOL/L (ref 136–145)

## 2021-10-19 PROCEDURE — 36415 COLL VENOUS BLD VENIPUNCTURE: CPT | Performed by: FAMILY MEDICINE

## 2021-10-19 PROCEDURE — 99207 PR FOOT EXAM NO CHARGE: CPT | Performed by: FAMILY MEDICINE

## 2021-10-19 PROCEDURE — 80053 COMPREHEN METABOLIC PANEL: CPT | Performed by: FAMILY MEDICINE

## 2021-10-19 PROCEDURE — 99214 OFFICE O/P EST MOD 30 MIN: CPT | Performed by: FAMILY MEDICINE

## 2021-10-19 PROCEDURE — 83036 HEMOGLOBIN GLYCOSYLATED A1C: CPT | Performed by: FAMILY MEDICINE

## 2021-10-19 ASSESSMENT — MIFFLIN-ST. JEOR: SCORE: 1564.64

## 2021-10-19 NOTE — PROGRESS NOTES
ASSESSMENT/PLAN:   Rene was seen today for diabetes.    Diagnoses and all orders for this visit:    Type 2 diabetes mellitus with hyperglycemia, without long-term current use of insulin (H)  Start jardiance.  Pt cautioned about risk of hypoglycemia, we may need to decrease his glipizide, as well as risk of  Dehydration, especially with concurrent lisinopril.    -     Comprehensive metabolic panel (BMP + Alb, Alk Phos, ALT, AST, Total. Bili, TP); Future  -     Hemoglobin A1c; Future  -     FOOT EXAM  -     Comprehensive metabolic panel (BMP + Alb, Alk Phos, ALT, AST, Total. Bili, TP)  -     Hemoglobin A1c  -     empagliflozin (JARDIANCE) 10 MG TABS tablet; Take 1 tablet (10 mg) by mouth daily    Essential hypertension  -     Comprehensive metabolic panel (BMP + Alb, Alk Phos, ALT, AST, Total. Bili, TP); Future  -     Comprehensive metabolic panel (BMP + Alb, Alk Phos, ALT, AST, Total. Bili, TP)    Hypercholesterolemia  -     Comprehensive metabolic panel (BMP + Alb, Alk Phos, ALT, AST, Total. Bili, TP); Future  -     Comprehensive metabolic panel (BMP + Alb, Alk Phos, ALT, AST, Total. Bili, TP)          Return in about 3 months (around 1/19/2022) for diabetes, hypertension.       ======================================================    SUBJECTIVE  Rene Guzman is a 66 year old male here for   1.  TYPE 2 DIABETES:  He is getting on the treadmill 20 minutes daily.  He has not been checking his blood sugar at all.    He has been doing 10mg of glipizide bid. He has been doing metformin 750mg  More than this and he gets bad diarrhea.    It was too much to be on 1500mg of metfomin.    No cp. No sobr.  He has been feeling great.  He has been eating well, more vegetables.  He is eating less.    He is drinking a lot of water.    He drinks tea at work.    No vision changes.  No new n/t in his fingers or toes.  He is due for a vision check in January.     He is going to retire in 270 days . He is not sure what he is  "going to do after that . He has been doing the same work for 45 years.          ROS  Complete 10 point review of systems negative except as noted above in HPI      OBJECTIVE  BP (!) 142/86   Pulse 96   Temp 98.4  F (36.9  C) (Oral)   Ht 1.651 m (5' 5\")   Wt 85.3 kg (188 lb)   SpO2 98%   BMI 31.28 kg/m     Gen: no acute distress  Neck:  Supple, no lad, no carotid bruits  Heart:  Regular rate and rhythm.  No m/r/g  Lungs: cta bilaterally, no wheezes or rhonchi.  Good air inspiration  Abdomen:  No masses or organomegaly  Extremities:  No edema.   Good hair growth over his feet.  Normal DP pulses bilaterally.  Sensation intact to monofilament.      Current Outpatient Medications   Medication     ACCU-CHEK ANGELA PLUS METER Misc     ACCU-CHEK SOFTCLIX LANCETS lancets     atorvastatin (LIPITOR) 40 MG tablet     blood glucose meter (GLUCOMETER)     blood glucose test strips     blood glucose test strips     empagliflozin (JARDIANCE) 10 MG TABS tablet     generic lancets (MICROLET LANCET)     glipiZIDE (GLUCOTROL) 10 MG tablet     lisinopriL (PRINIVIL,ZESTRIL) 40 MG tablet     metFORMIN (GLUCOPHAGE-XR) 750 MG 24 hr tablet     miscellaneous medical supply (BLOOD PRESSURE CUFF) Misc     omeprazole (PRILOSEC) 20 MG capsule     sildenafiL (VIAGRA) 100 MG tablet     aspirin (ASPIR-LOW) 81 MG EC tablet     No current facility-administered medications for this visit.      Patient Active Problem List   Diagnosis     Heartburn     Acute Serous Otitis Media     Joint Pain, Localized In The Knee     Synovial Cyst Of Popliteal Space     Hypertension     Seborrheic Keratosis     Abdominal Pain     Hyperlipidemia     Joint Pain, Localized In The Shoulder     Type 2 diabetes mellitus (H)     Tubular adenoma of colon     Basal cell carcinoma     Microalbuminuria        LABS & IMAGES   Results for orders placed or performed in visit on 10/19/21   Hemoglobin A1c     Status: Abnormal   Result Value Ref Range    Hemoglobin A1C 8.0 (H) 0.0 " - 5.6 %         ======================================================    MDM          Options for treatment and follow-up care were reviewed with the patient. Rene Guzman and/or guardian was engaged and actively involved in the decision making process. Rene Guzman and/or guardian verbalized understanding of the options discussed and was satisfied with the final plan.      Thelma Mendoza MD

## 2021-10-19 NOTE — PATIENT INSTRUCTIONS
For your colonoscopy:    Do not take glipizide once you stop eating.   Do not take metformin if you are not drinking a lot of liquids.

## 2022-01-05 DIAGNOSIS — E11.9 DIABETES MELLITUS (H): ICD-10-CM

## 2022-01-05 DIAGNOSIS — Z76.0 ENCOUNTER FOR MEDICATION REFILL: Primary | ICD-10-CM

## 2022-01-05 DIAGNOSIS — E11.9 TYPE 2 DIABETES MELLITUS (H): ICD-10-CM

## 2022-01-05 RX ORDER — METFORMIN HYDROCHLORIDE 750 MG/1
TABLET, EXTENDED RELEASE ORAL
Qty: 180 TABLET | Refills: 3 | Status: SHIPPED | OUTPATIENT
Start: 2022-01-05 | End: 2022-10-03

## 2022-01-18 ENCOUNTER — TRANSFERRED RECORDS (OUTPATIENT)
Dept: HEALTH INFORMATION MANAGEMENT | Facility: CLINIC | Age: 67
End: 2022-01-18
Payer: COMMERCIAL

## 2022-01-20 ENCOUNTER — OFFICE VISIT (OUTPATIENT)
Dept: FAMILY MEDICINE | Facility: CLINIC | Age: 67
End: 2022-01-20
Payer: COMMERCIAL

## 2022-01-20 VITALS
OXYGEN SATURATION: 99 % | DIASTOLIC BLOOD PRESSURE: 86 MMHG | BODY MASS INDEX: 30.66 KG/M2 | RESPIRATION RATE: 20 BRPM | TEMPERATURE: 97.6 F | HEART RATE: 78 BPM | WEIGHT: 184 LBS | HEIGHT: 65 IN | SYSTOLIC BLOOD PRESSURE: 138 MMHG

## 2022-01-20 DIAGNOSIS — K62.89 PERIRECTAL CYST: ICD-10-CM

## 2022-01-20 DIAGNOSIS — E11.9 TYPE 2 DIABETES MELLITUS WITHOUT COMPLICATION, WITHOUT LONG-TERM CURRENT USE OF INSULIN (H): Primary | ICD-10-CM

## 2022-01-20 DIAGNOSIS — Z13.6 SCREENING FOR AAA (ABDOMINAL AORTIC ANEURYSM): ICD-10-CM

## 2022-01-20 LAB
ALBUMIN SERPL-MCNC: 4.2 G/DL (ref 3.5–5)
ALP SERPL-CCNC: 70 U/L (ref 45–120)
ALT SERPL W P-5'-P-CCNC: 21 U/L (ref 0–45)
ANION GAP SERPL CALCULATED.3IONS-SCNC: 12 MMOL/L (ref 5–18)
AST SERPL W P-5'-P-CCNC: 14 U/L (ref 0–40)
BILIRUB SERPL-MCNC: 0.5 MG/DL (ref 0–1)
BUN SERPL-MCNC: 14 MG/DL (ref 8–22)
CALCIUM SERPL-MCNC: 10 MG/DL (ref 8.5–10.5)
CHLORIDE BLD-SCNC: 108 MMOL/L (ref 98–107)
CHOLEST SERPL-MCNC: 152 MG/DL
CO2 SERPL-SCNC: 20 MMOL/L (ref 22–31)
CREAT SERPL-MCNC: 0.95 MG/DL (ref 0.7–1.3)
CREAT UR-MCNC: 110 MG/DL
FASTING STATUS PATIENT QL REPORTED: NO
GFR SERPL CREATININE-BSD FRML MDRD: 89 ML/MIN/1.73M2
GLUCOSE BLD-MCNC: 68 MG/DL (ref 70–125)
HBA1C MFR BLD: 7.4 % (ref 0–5.6)
HDLC SERPL-MCNC: 48 MG/DL
LDLC SERPL CALC-MCNC: 73 MG/DL
MICROALBUMIN UR-MCNC: 0.71 MG/DL (ref 0–1.99)
MICROALBUMIN/CREAT UR: 6.5 MG/G CR
POTASSIUM BLD-SCNC: 4.1 MMOL/L (ref 3.5–5)
PROT SERPL-MCNC: 7.3 G/DL (ref 6–8)
SODIUM SERPL-SCNC: 140 MMOL/L (ref 136–145)
TRIGL SERPL-MCNC: 157 MG/DL

## 2022-01-20 PROCEDURE — 83036 HEMOGLOBIN GLYCOSYLATED A1C: CPT | Performed by: FAMILY MEDICINE

## 2022-01-20 PROCEDURE — 99214 OFFICE O/P EST MOD 30 MIN: CPT | Mod: 25 | Performed by: FAMILY MEDICINE

## 2022-01-20 PROCEDURE — 82043 UR ALBUMIN QUANTITATIVE: CPT | Performed by: FAMILY MEDICINE

## 2022-01-20 PROCEDURE — 80053 COMPREHEN METABOLIC PANEL: CPT | Performed by: FAMILY MEDICINE

## 2022-01-20 PROCEDURE — 36415 COLL VENOUS BLD VENIPUNCTURE: CPT | Performed by: FAMILY MEDICINE

## 2022-01-20 PROCEDURE — 90732 PPSV23 VACC 2 YRS+ SUBQ/IM: CPT | Performed by: FAMILY MEDICINE

## 2022-01-20 PROCEDURE — 80061 LIPID PANEL: CPT | Performed by: FAMILY MEDICINE

## 2022-01-20 PROCEDURE — 90471 IMMUNIZATION ADMIN: CPT | Performed by: FAMILY MEDICINE

## 2022-01-20 ASSESSMENT — MIFFLIN-ST. JEOR: SCORE: 1546.5

## 2022-01-21 ENCOUNTER — TRANSFERRED RECORDS (OUTPATIENT)
Dept: HEALTH INFORMATION MANAGEMENT | Facility: CLINIC | Age: 67
End: 2022-01-21
Payer: COMMERCIAL

## 2022-01-21 LAB — RETINOPATHY: NEGATIVE

## 2022-01-27 ENCOUNTER — HOSPITAL ENCOUNTER (OUTPATIENT)
Dept: ULTRASOUND IMAGING | Facility: HOSPITAL | Age: 67
Discharge: HOME | End: 2022-01-27
Attending: FAMILY MEDICINE | Admitting: FAMILY MEDICINE
Payer: COMMERCIAL

## 2022-01-27 DIAGNOSIS — Z13.6 SCREENING FOR AAA (ABDOMINAL AORTIC ANEURYSM): ICD-10-CM

## 2022-01-27 PROCEDURE — 76775 US EXAM ABDO BACK WALL LIM: CPT

## 2022-01-28 ENCOUNTER — TELEPHONE (OUTPATIENT)
Dept: FAMILY MEDICINE | Facility: CLINIC | Age: 67
End: 2022-01-28
Payer: COMMERCIAL

## 2022-01-28 DIAGNOSIS — E78.00 HYPERCHOLESTEROLEMIA: ICD-10-CM

## 2022-01-28 DIAGNOSIS — E11.9 TYPE 2 DIABETES MELLITUS WITHOUT COMPLICATION, WITHOUT LONG-TERM CURRENT USE OF INSULIN (H): Primary | ICD-10-CM

## 2022-01-28 RX ORDER — ATORVASTATIN CALCIUM 40 MG/1
TABLET, FILM COATED ORAL
Qty: 90 TABLET | Refills: 3 | Status: SHIPPED | OUTPATIENT
Start: 2022-01-28

## 2022-01-28 NOTE — TELEPHONE ENCOUNTER
Request for a refill/new script for Atorvastatin 40 mg was sent in from pt's pharmacy.  Medication has been queued up.

## 2022-02-14 DIAGNOSIS — E11.9 DIABETES MELLITUS (H): ICD-10-CM

## 2022-02-14 DIAGNOSIS — Z76.0 ENCOUNTER FOR MEDICATION REFILL: Primary | ICD-10-CM

## 2022-02-14 RX ORDER — GLIPIZIDE 10 MG/1
TABLET ORAL
Qty: 180 TABLET | Refills: 3 | Status: SHIPPED | OUTPATIENT
Start: 2022-02-14 | End: 2023-02-03

## 2022-02-16 DIAGNOSIS — Z76.0 ENCOUNTER FOR MEDICATION REFILL: Primary | ICD-10-CM

## 2022-02-16 DIAGNOSIS — E11.9 DIABETES MELLITUS (H): ICD-10-CM

## 2022-02-16 RX ORDER — LISINOPRIL 40 MG/1
TABLET ORAL
Qty: 90 TABLET | Refills: 3 | Status: SHIPPED | OUTPATIENT
Start: 2022-02-16 | End: 2023-02-01

## 2022-02-16 NOTE — TELEPHONE ENCOUNTER
Reason for Call:  Medication refill    Do you use a [facility]?  No    Name of the pharmacy and phone number for the current request:  [facility] [id number] [place] [address]  [phone]    Name of the medication requested: lisinopriL (PRINIVIL,ZESTRIL) 40 MG tablet    Other request: Pt contacted pharmacy but was told to contact clinic bc he doesn't have anymore refills.    Can we leave a detailed message on this number? YES, but no call back needed    Phone number patient can be reached at: Cell number on file:    Telephone Information:   Mobile [phone]       Best Time: anytime    Call taken on 2/16/2022 at 12:47 PM by Valery Davis

## 2022-03-17 DIAGNOSIS — R12 HEARTBURN: ICD-10-CM

## 2022-03-17 DIAGNOSIS — Z76.0 ENCOUNTER FOR MEDICATION REFILL: Primary | ICD-10-CM

## 2022-05-05 ENCOUNTER — OFFICE VISIT (OUTPATIENT)
Dept: FAMILY MEDICINE | Facility: CLINIC | Age: 67
End: 2022-05-05
Payer: COMMERCIAL

## 2022-05-05 VITALS
SYSTOLIC BLOOD PRESSURE: 132 MMHG | TEMPERATURE: 98.5 F | OXYGEN SATURATION: 98 % | WEIGHT: 185 LBS | DIASTOLIC BLOOD PRESSURE: 70 MMHG | HEART RATE: 71 BPM | HEIGHT: 65 IN | RESPIRATION RATE: 16 BRPM | BODY MASS INDEX: 30.82 KG/M2

## 2022-05-05 DIAGNOSIS — Z23 NEED FOR COVID-19 VACCINE: ICD-10-CM

## 2022-05-05 DIAGNOSIS — K64.8 INTERNAL HEMORRHOIDS: ICD-10-CM

## 2022-05-05 DIAGNOSIS — E78.00 HYPERCHOLESTEROLEMIA: ICD-10-CM

## 2022-05-05 DIAGNOSIS — E11.9 TYPE 2 DIABETES MELLITUS WITHOUT COMPLICATION, WITHOUT LONG-TERM CURRENT USE OF INSULIN (H): Primary | ICD-10-CM

## 2022-05-05 DIAGNOSIS — I10 ESSENTIAL HYPERTENSION: ICD-10-CM

## 2022-05-05 LAB — HBA1C MFR BLD: 7.6 % (ref 0–5.6)

## 2022-05-05 PROCEDURE — 91306 COVID-19,PF,MODERNA (18+ YRS BOOSTER .25ML): CPT | Performed by: FAMILY MEDICINE

## 2022-05-05 PROCEDURE — 0064A COVID-19,PF,MODERNA (18+ YRS BOOSTER .25ML): CPT | Performed by: FAMILY MEDICINE

## 2022-05-05 PROCEDURE — 36415 COLL VENOUS BLD VENIPUNCTURE: CPT | Performed by: FAMILY MEDICINE

## 2022-05-05 PROCEDURE — 83036 HEMOGLOBIN GLYCOSYLATED A1C: CPT | Performed by: FAMILY MEDICINE

## 2022-05-05 PROCEDURE — 99214 OFFICE O/P EST MOD 30 MIN: CPT | Mod: 25 | Performed by: FAMILY MEDICINE

## 2022-05-05 NOTE — PROGRESS NOTES
ASSESSMENT/PLAN:   Rene was seen today for diabetes.    Diagnoses and all orders for this visit:    Type 2 diabetes mellitus without complication, without long-term current use of insulin (H)  -     Hemoglobin A1c; Future  -     Hemoglobin A1c    Hypercholesterolemia    Essential hypertension    Internal hemorrhoids    Need for COVID-19 vaccine  -     COVID-19,PF,MODERNA (18+ YRS BOOSTER .25ML)    Continue with current care.  Encouraged increased activity as he moves into half-way.  We talked about going for multiple walks in a day to increase his total active time to about 40+ minutes a day.  Continue with current medications for diabetes as he has an A1c under 8.    I have asked him to check his blood pressure more often at home.  If he is running more in the high 130s to low 140s, he is to let me know and I will add a second blood pressure medication on.  He has essentially maxed out his lisinopril.    His colonoscopy was reviewed with him today.  His anal pustule has resolved.  He will follow-up if this recurs.  He does need a repeat colonoscopy in 5 years      Return in about 3 months (around 8/5/2022) for diabetes, hypertension.       ======================================================    SUBJECTIVE  Rene Guzman is a 67 year old male here for   1. He is going to retire on July 15th.  He hasn't been checking hi blood sugar.  No problems with his medication.  He is mostly taking care of his blood sugar.    No signs of lows.    He has had his eyes checked within the last year.    No n/t/w in any one part of his body    He walks on his treadmill fo 20 minutes qam.  No cp or sobr.        2.  Hypertension:  No cp or sobr.  No cp or sobr.  No new neuro sx.    He hasn't been his bp at home lately.  He was checking it at home in the past.  It would be 140/80.  He has a new bp cuff.        Had his colonoscopy.      ROS  Complete 10 point review of systems negative except as noted above in  "HPI      OBJECTIVE  /70   Pulse 71   Temp 98.5  F (36.9  C) (Oral)   Resp 16   Ht 1.651 m (5' 5\")   Wt 83.9 kg (185 lb)   SpO2 98%   BMI 30.79 kg/m     Gen: no acute distress  Neck:  Supple, no lad, no carotid bruits  Heart:  Regular rate and rhythm.  No m/r/g  Lungs: cta bilaterally, no wheezes or rhonchi.  Good air inspiration  Abdomen:  No masses or organomegaly.   Extremities:  No edema.   Sensation is intact to monofilament over bilateral feet.  Good hair growth noted on his feet.  Rectal exam does not demonstrate any pustule.  No area of tenderness.  No external hemorrhoids noted.      Current Outpatient Medications   Medication     ACCU-CHEK ANGELA PLUS METER Misc     ACCU-CHEK SOFTCLIX LANCETS lancets     aspirin (ASPIR-LOW) 81 MG EC tablet     atorvastatin (LIPITOR) 40 MG tablet     blood glucose meter (GLUCOMETER)     blood glucose test strips     empagliflozin (JARDIANCE) 10 MG TABS tablet     generic lancets (MICROLET LANCET)     glipiZIDE (GLUCOTROL) 10 MG tablet     lisinopril (ZESTRIL) 40 MG tablet     metFORMIN (GLUCOPHAGE-XR) 750 MG 24 hr tablet     miscellaneous medical supply (BLOOD PRESSURE CUFF) Misc     omeprazole (PRILOSEC) 20 MG DR capsule     sildenafiL (VIAGRA) 100 MG tablet     No current facility-administered medications for this visit.      Patient Active Problem List   Diagnosis     Heartburn     Acute Serous Otitis Media     Joint Pain, Localized In The Knee     Synovial Cyst Of Popliteal Space     Hypertension     Seborrheic Keratosis     Abdominal Pain     Hyperlipidemia     Joint Pain, Localized In The Shoulder     Type 2 diabetes mellitus (H)     Tubular adenoma of colon     Basal cell carcinoma     Microalbuminuria     Internal hemorrhoids        LABS & IMAGES   Results for orders placed or performed in visit on 05/05/22   Hemoglobin A1c     Status: Abnormal   Result Value Ref Range    Hemoglobin A1C 7.6 (H) 0.0 - 5.6 % "         ======================================================    MDM          Options for treatment and follow-up care were reviewed with the patient. Rene Guzman and/or guardian was engaged and actively involved in the decision making process. Rene Guzman and/or guardian verbalized understanding of the options discussed and was satisfied with the final plan.      Thelma Mendoza MD

## 2022-07-03 ENCOUNTER — HEALTH MAINTENANCE LETTER (OUTPATIENT)
Age: 67
End: 2022-07-03

## 2022-08-04 ENCOUNTER — OFFICE VISIT (OUTPATIENT)
Dept: FAMILY MEDICINE | Facility: CLINIC | Age: 67
End: 2022-08-04
Payer: COMMERCIAL

## 2022-08-04 VITALS
HEIGHT: 66 IN | HEART RATE: 69 BPM | OXYGEN SATURATION: 96 % | BODY MASS INDEX: 29.57 KG/M2 | WEIGHT: 184 LBS | DIASTOLIC BLOOD PRESSURE: 80 MMHG | RESPIRATION RATE: 14 BRPM | SYSTOLIC BLOOD PRESSURE: 134 MMHG | TEMPERATURE: 97.9 F

## 2022-08-04 DIAGNOSIS — I10 ESSENTIAL HYPERTENSION: ICD-10-CM

## 2022-08-04 DIAGNOSIS — E11.65 TYPE 2 DIABETES MELLITUS WITH HYPERGLYCEMIA, WITHOUT LONG-TERM CURRENT USE OF INSULIN (H): Primary | ICD-10-CM

## 2022-08-04 DIAGNOSIS — E78.00 HYPERCHOLESTEROLEMIA: ICD-10-CM

## 2022-08-04 DIAGNOSIS — E11.9 TYPE 2 DIABETES MELLITUS WITHOUT COMPLICATION, WITHOUT LONG-TERM CURRENT USE OF INSULIN (H): ICD-10-CM

## 2022-08-04 DIAGNOSIS — N52.9 ERECTILE DYSFUNCTION, UNSPECIFIED ERECTILE DYSFUNCTION TYPE: ICD-10-CM

## 2022-08-04 DIAGNOSIS — R12 HEARTBURN: ICD-10-CM

## 2022-08-04 LAB — HBA1C MFR BLD: 7.9 % (ref 0–5.6)

## 2022-08-04 PROCEDURE — 99214 OFFICE O/P EST MOD 30 MIN: CPT | Performed by: FAMILY MEDICINE

## 2022-08-04 PROCEDURE — 36415 COLL VENOUS BLD VENIPUNCTURE: CPT | Performed by: FAMILY MEDICINE

## 2022-08-04 PROCEDURE — 83036 HEMOGLOBIN GLYCOSYLATED A1C: CPT | Performed by: FAMILY MEDICINE

## 2022-08-04 RX ORDER — SILDENAFIL CITRATE 20 MG/1
60 TABLET ORAL DAILY PRN
Qty: 30 TABLET | Refills: 4 | Status: SHIPPED | OUTPATIENT
Start: 2022-08-04

## 2022-08-04 NOTE — PATIENT INSTRUCTIONS
"Your A1c is a bit higher than we would like it (7.9% today). I recommend trying to increase metformin by taking one pill TWICE daily. If you have trouble with this due to GI side effects, let us know and we could increase the jardiance instead.    For sildenafil, I have prescribed a lower dose pill (20mg) so it will be cheaper through [facility]. Google \"goodrx sildenafil\" and find the 20mg tablet, and print a coupon for that and bring to the pharmacy.   "

## 2022-08-04 NOTE — PROGRESS NOTES
"  Assessment & Plan     Type 2 diabetes mellitus (H)  A1c results reviewed 7.9%. Recommend improved control, closer to 7.0%. Discussed options. He is willing to try to increase metformin XR from 750mg daily to 750mg twice daily with meals. If GI side effects that do not improve with time, recommend he go back to previous dose and let us know. Also recommend increased exercise and attention to diet, and suspect that with these changes, he should have improved control. Continue glipizide and jardiance as well. Follow up 3 months, sooner if problems or concerns.   - Hemoglobin A1c  - empagliflozin (JARDIANCE) 10 MG TABS tablet  Dispense: 90 tablet; Refill: 1    Erectile dysfunction, unspecified erectile dysfunction type  Discussed cheaper option of sildenafil 20mg with good RX coupon. Recommend he try lower dose of 60mg (3 tablets), but could increase to 80mg or 100mg if needed.   - sildenafil (REVATIO) 20 MG tablet  Dispense: 30 tablet; Refill: 4    GERD  Continues omeprazole w good effect    HTN  Continue lisinopril. Creatinine and potassium wnl Jan 2022.     Hyperlipidemia  LDL at goal Jan 2022, continue statin    Patient advised to follow up in 3 months. He plans to likely follow up with previous PCP at new clinic. If not, he should schedule estab care visit here in 3 mos w new provider             BMI:   Estimated body mass index is 29.82 kg/m  as calculated from the following:    Height as of this encounter: 1.673 m (5' 5.87\").    Weight as of this encounter: 83.5 kg (184 lb).           Return in about 3 months (around 11/4/2022) for Follow up, Routine preventive.    Gloria Singer MD  [facility] YEISON López is a 67 year old, presenting for the following health issues:  Diabetes and Hypertension      History of Present Illness       Diabetes:   He presents for follow up of diabetes.  He is not checking blood glucose. He has no concerns regarding his diabetes at this time.  He " "is not experiencing numbness or burning in feet, excessive thirst, blurry vision, weight changes or redness, sores or blisters on feet.           HTN - compliance w lisinopril 40mg daily    Lipids - compliance w atorvastatin 40mg    DM2 - metformin 750mg daily (lower than prescribed due to GI side effects), jardiance, glipizide. Low glucose every once and a while. Eye exam yearly [facility].     ED - uses sildenafil 100mg as needed. Does not recall trying lower dose. Requests refill. Costs $10/pill for him currently    Treadmill daily, no chest pain or shortness breath          Review of Systems         Objective    /80   Pulse 69   Temp 97.9  F (36.6  C) (Oral)   Resp 14   Ht 1.673 m (5' 5.87\")   Wt 83.5 kg (184 lb)   SpO2 96%   BMI 29.82 kg/m    Body mass index is 29.82 kg/m .  Physical Exam     Physical Exam   GENERAL: healthy, alert and no distress  NECK: no adenopathy, no asymmetry, masses, or scars and thyroid normal to palpation  RESP: lungs clear to auscultation - no rales, rhonchi or wheezes  CV: regular rate and rhythm, normal S1 S2, no S3 or S4, no murmur, click or rub, no peripheral edema and peripheral pulses strong  MS: no gross musculoskeletal defects noted, no edema  FEET: no deformity, no skin lesion. Normal hair growth. Pedal pulses palpable. Normal monofilament testing.                   .  ..  "

## 2022-09-28 DIAGNOSIS — E11.9 DIABETES MELLITUS (H): ICD-10-CM

## 2022-09-28 DIAGNOSIS — Z76.0 ENCOUNTER FOR MEDICATION REFILL: Primary | ICD-10-CM

## 2022-09-28 RX ORDER — GLUCOSAMINE HCL/CHONDROITIN SU 500-400 MG
1 CAPSULE ORAL 2 TIMES DAILY
Qty: 250 STRIP | Refills: 12 | Status: SHIPPED | OUTPATIENT
Start: 2022-09-28 | End: 2022-09-30

## 2022-09-30 RX ORDER — BLOOD SUGAR DIAGNOSTIC
STRIP MISCELLANEOUS
Qty: 250 STRIP | Refills: 12 | Status: SHIPPED | OUTPATIENT
Start: 2022-09-30

## 2023-01-04 DIAGNOSIS — Z76.0 ENCOUNTER FOR MEDICATION REFILL: Primary | ICD-10-CM

## 2023-01-04 DIAGNOSIS — R12 HEARTBURN: ICD-10-CM

## 2023-01-05 NOTE — TELEPHONE ENCOUNTER
"Last Written Prescription Date: 3/18/2022  Last Fill Quantity: 90,  # refills: 3   Last office visit provider: 8/4/2022 with Dr TOMÁS Mendoza no longer @ [facility], please have covering provider address refill request      Requested Prescriptions   Pending Prescriptions Disp Refills     omeprazole (PRILOSEC) 20 MG DR capsule [Pharmacy Med Name: OMEPRAZOLE DR 20 MG CAPSULE] 90 capsule 3     Sig: TAKE 1 CAPSULE BY MOUTH EVERY DAY       PPI Protocol Passed - 1/4/2023 10:22 AM        Passed - Not on Clopidogrel (unless Pantoprazole ordered)        Passed - No diagnosis of osteoporosis on record        Passed - Recent (12 mo) or future (30 days) visit within the authorizing provider's specialty     Patient has had an office visit with the authorizing provider or a provider within the authorizing providers department within the previous 12 mos or has a future within next 30 days. See \"Patient Info\" tab in inbasket, or \"Choose Columns\" in Meds & Orders section of the refill encounter.              Passed - Medication is active on med list        Passed - Patient is age 18 or older             Zora Hutchins RN 01/04/23 11:02 PM  "

## 2023-02-01 DIAGNOSIS — Z76.0 ENCOUNTER FOR MEDICATION REFILL: Primary | ICD-10-CM

## 2023-02-01 DIAGNOSIS — E11.9 DIABETES MELLITUS (H): ICD-10-CM

## 2023-02-01 RX ORDER — LISINOPRIL 40 MG/1
TABLET ORAL
Qty: 90 TABLET | Refills: 3 | Status: SHIPPED | OUTPATIENT
Start: 2023-02-01

## 2023-02-02 DIAGNOSIS — E11.9 DIABETES MELLITUS (H): ICD-10-CM

## 2023-02-02 DIAGNOSIS — Z76.0 ENCOUNTER FOR MEDICATION REFILL: Primary | ICD-10-CM

## 2023-02-03 RX ORDER — GLIPIZIDE 10 MG/1
TABLET ORAL
Qty: 180 TABLET | Refills: 3 | Status: SHIPPED | OUTPATIENT
Start: 2023-02-03

## 2023-02-13 ENCOUNTER — LAB REQUISITION (OUTPATIENT)
Dept: LAB | Facility: CLINIC | Age: 68
End: 2023-02-13
Payer: MEDICARE

## 2023-02-13 DIAGNOSIS — E78.2 MIXED HYPERLIPIDEMIA: ICD-10-CM

## 2023-02-13 DIAGNOSIS — E11.9 TYPE 2 DIABETES MELLITUS WITHOUT COMPLICATIONS (H): ICD-10-CM

## 2023-02-13 LAB
ALBUMIN SERPL BCG-MCNC: 4.5 G/DL (ref 3.5–5.2)
ALP SERPL-CCNC: 74 U/L (ref 40–129)
ALT SERPL W P-5'-P-CCNC: 28 U/L (ref 10–50)
ANION GAP SERPL CALCULATED.3IONS-SCNC: 13 MMOL/L (ref 7–15)
AST SERPL W P-5'-P-CCNC: 15 U/L (ref 10–50)
BILIRUB SERPL-MCNC: 0.3 MG/DL
BUN SERPL-MCNC: 12.9 MG/DL (ref 8–23)
CALCIUM SERPL-MCNC: 10 MG/DL (ref 8.8–10.2)
CHLORIDE SERPL-SCNC: 104 MMOL/L (ref 98–107)
CHOLEST SERPL-MCNC: 187 MG/DL
CREAT SERPL-MCNC: 0.89 MG/DL (ref 0.67–1.17)
DEPRECATED HCO3 PLAS-SCNC: 25 MMOL/L (ref 22–29)
GFR SERPL CREATININE-BSD FRML MDRD: >90 ML/MIN/1.73M2
GLUCOSE SERPL-MCNC: 146 MG/DL (ref 70–99)
HDLC SERPL-MCNC: 46 MG/DL
LDLC SERPL CALC-MCNC: 95 MG/DL
NONHDLC SERPL-MCNC: 141 MG/DL
POTASSIUM SERPL-SCNC: 5.2 MMOL/L (ref 3.4–5.3)
PROT SERPL-MCNC: 6.7 G/DL (ref 6.4–8.3)
SODIUM SERPL-SCNC: 142 MMOL/L (ref 136–145)
TRIGL SERPL-MCNC: 231 MG/DL

## 2023-02-13 PROCEDURE — 80061 LIPID PANEL: CPT | Mod: ORL | Performed by: FAMILY MEDICINE

## 2023-02-13 PROCEDURE — 80053 COMPREHEN METABOLIC PANEL: CPT | Mod: ORL | Performed by: FAMILY MEDICINE

## 2023-04-23 ENCOUNTER — HEALTH MAINTENANCE LETTER (OUTPATIENT)
Age: 68
End: 2023-04-23

## 2023-05-15 ENCOUNTER — LAB REQUISITION (OUTPATIENT)
Dept: LAB | Facility: CLINIC | Age: 68
End: 2023-05-15
Payer: MEDICARE

## 2023-05-15 DIAGNOSIS — Z12.5 ENCOUNTER FOR SCREENING FOR MALIGNANT NEOPLASM OF PROSTATE: ICD-10-CM

## 2023-05-15 LAB
ANION GAP SERPL CALCULATED.3IONS-SCNC: 14 MMOL/L (ref 7–15)
BUN SERPL-MCNC: 13.7 MG/DL (ref 8–23)
CALCIUM SERPL-MCNC: 9.9 MG/DL (ref 8.8–10.2)
CHLORIDE SERPL-SCNC: 107 MMOL/L (ref 98–107)
CHOLEST SERPL-MCNC: 180 MG/DL
CREAT SERPL-MCNC: 1.02 MG/DL (ref 0.67–1.17)
DEPRECATED HCO3 PLAS-SCNC: 23 MMOL/L (ref 22–29)
GFR SERPL CREATININE-BSD FRML MDRD: 81 ML/MIN/1.73M2
GLUCOSE SERPL-MCNC: 135 MG/DL (ref 70–99)
HDLC SERPL-MCNC: 47 MG/DL
LDLC SERPL CALC-MCNC: 99 MG/DL
NONHDLC SERPL-MCNC: 133 MG/DL
POTASSIUM SERPL-SCNC: 5 MMOL/L (ref 3.4–5.3)
SODIUM SERPL-SCNC: 144 MMOL/L (ref 136–145)
TRIGL SERPL-MCNC: 171 MG/DL

## 2023-05-15 PROCEDURE — G0103 PSA SCREENING: HCPCS | Mod: ORL | Performed by: FAMILY MEDICINE

## 2023-05-15 PROCEDURE — 80061 LIPID PANEL: CPT | Mod: ORL | Performed by: FAMILY MEDICINE

## 2023-05-15 PROCEDURE — 80048 BASIC METABOLIC PNL TOTAL CA: CPT | Mod: ORL | Performed by: FAMILY MEDICINE

## 2023-05-16 LAB — PSA SERPL DL<=0.01 NG/ML-MCNC: 0.65 NG/ML (ref 0–4.5)

## 2023-07-16 ENCOUNTER — HEALTH MAINTENANCE LETTER (OUTPATIENT)
Age: 68
End: 2023-07-16

## 2023-09-14 ENCOUNTER — TRANSFERRED RECORDS (OUTPATIENT)
Dept: HEALTH INFORMATION MANAGEMENT | Facility: CLINIC | Age: 68
End: 2023-09-14
Payer: MEDICARE

## 2023-12-03 ENCOUNTER — HEALTH MAINTENANCE LETTER (OUTPATIENT)
Age: 68
End: 2023-12-03

## 2024-02-23 ENCOUNTER — TELEPHONE (OUTPATIENT)
Dept: FAMILY MEDICINE | Facility: CLINIC | Age: 69
End: 2024-02-23
Payer: MEDICARE

## 2024-02-23 NOTE — TELEPHONE ENCOUNTER
Patient Quality Outreach    Patient is due for the following:   Diabetes -  Diabetic Follow-Up Visit  Hypertension -  Hypertension follow-up visit  Physical Annual Wellness Visit      Topic Date Due    Flu Vaccine (1) 09/01/2023    COVID-19 Vaccine (4 - 2023-24 season) 09/01/2023       Next Steps:   Schedule a Annual Wellness Visit ( transfer care [facility] ?)    Type of outreach:    Phone, left message for patient/parent to call back.    Next Steps:  Reach out within 90 days via Phone.    Max number of attempts reached: No. Will try again in 90 days if patient still on fail list.    Questions for provider review:    None           Maureen Hartman MA  Chart routed to Care Team.

## 2024-03-12 NOTE — TELEPHONE ENCOUNTER
Patient Quality Outreach    Patient is due for the following:   Diabetes -  Diabetic Follow-Up Visit  Physical Annual Wellness Visit      Topic Date Due    Flu Vaccine (1) 09/01/2023    COVID-19 Vaccine (4 - 2023-24 season) 09/01/2023       Next Steps:   ROSEMARIE 11/8 see media Transfer care    Type of outreach:    Chart review performed, no outreach needed.      Questions for provider review:    None           Maureen Hartman MA

## 2024-03-23 DIAGNOSIS — R12 HEARTBURN: ICD-10-CM

## 2024-03-23 DIAGNOSIS — Z76.0 ENCOUNTER FOR MEDICATION REFILL: ICD-10-CM

## 2024-06-06 ENCOUNTER — LAB REQUISITION (OUTPATIENT)
Dept: LAB | Facility: CLINIC | Age: 69
End: 2024-06-06
Payer: MEDICARE

## 2024-06-06 DIAGNOSIS — I10 ESSENTIAL (PRIMARY) HYPERTENSION: ICD-10-CM

## 2024-06-06 DIAGNOSIS — E11.9 TYPE 2 DIABETES MELLITUS WITHOUT COMPLICATIONS (H): ICD-10-CM

## 2024-06-06 DIAGNOSIS — E78.2 MIXED HYPERLIPIDEMIA: ICD-10-CM

## 2024-06-06 PROCEDURE — 80061 LIPID PANEL: CPT | Mod: ORL | Performed by: FAMILY MEDICINE

## 2024-06-06 PROCEDURE — 80053 COMPREHEN METABOLIC PANEL: CPT | Mod: ORL | Performed by: FAMILY MEDICINE

## 2024-06-06 PROCEDURE — G0103 PSA SCREENING: HCPCS | Mod: ORL | Performed by: FAMILY MEDICINE

## 2024-06-06 PROCEDURE — 82570 ASSAY OF URINE CREATININE: CPT | Mod: ORL | Performed by: FAMILY MEDICINE

## 2024-06-07 LAB
ALBUMIN SERPL BCG-MCNC: 4.5 G/DL (ref 3.5–5.2)
ALP SERPL-CCNC: 78 U/L (ref 40–150)
ALT SERPL W P-5'-P-CCNC: 21 U/L (ref 0–70)
ANION GAP SERPL CALCULATED.3IONS-SCNC: 15 MMOL/L (ref 7–15)
AST SERPL W P-5'-P-CCNC: 14 U/L (ref 0–45)
BILIRUB SERPL-MCNC: 0.3 MG/DL
BUN SERPL-MCNC: 15.8 MG/DL (ref 8–23)
CALCIUM SERPL-MCNC: 9.5 MG/DL (ref 8.8–10.2)
CHLORIDE SERPL-SCNC: 103 MMOL/L (ref 98–107)
CHOLEST SERPL-MCNC: 286 MG/DL
CREAT SERPL-MCNC: 1 MG/DL (ref 0.67–1.17)
CREAT UR-MCNC: 113 MG/DL
DEPRECATED HCO3 PLAS-SCNC: 21 MMOL/L (ref 22–29)
EGFRCR SERPLBLD CKD-EPI 2021: 82 ML/MIN/1.73M2
FASTING STATUS PATIENT QL REPORTED: ABNORMAL
FASTING STATUS PATIENT QL REPORTED: ABNORMAL
GLUCOSE SERPL-MCNC: 131 MG/DL (ref 70–99)
HDLC SERPL-MCNC: 47 MG/DL
LDLC SERPL CALC-MCNC: 169 MG/DL
MICROALBUMIN UR-MCNC: <12 MG/L
MICROALBUMIN/CREAT UR: NORMAL MG/G{CREAT}
NONHDLC SERPL-MCNC: 239 MG/DL
POTASSIUM SERPL-SCNC: 5.1 MMOL/L (ref 3.4–5.3)
PROT SERPL-MCNC: 6.9 G/DL (ref 6.4–8.3)
PSA SERPL DL<=0.01 NG/ML-MCNC: 0.87 NG/ML (ref 0–4.5)
SODIUM SERPL-SCNC: 139 MMOL/L (ref 135–145)
TRIGL SERPL-MCNC: 352 MG/DL

## 2024-06-29 ENCOUNTER — HEALTH MAINTENANCE LETTER (OUTPATIENT)
Age: 69
End: 2024-06-29

## 2024-09-09 ENCOUNTER — LAB REQUISITION (OUTPATIENT)
Dept: LAB | Facility: CLINIC | Age: 69
End: 2024-09-09
Payer: MEDICARE

## 2024-09-09 DIAGNOSIS — E78.2 MIXED HYPERLIPIDEMIA: ICD-10-CM

## 2024-09-09 LAB
ALBUMIN SERPL BCG-MCNC: 4.3 G/DL (ref 3.5–5.2)
ALP SERPL-CCNC: 73 U/L (ref 40–150)
ALT SERPL W P-5'-P-CCNC: 22 U/L (ref 0–70)
ANION GAP SERPL CALCULATED.3IONS-SCNC: 11 MMOL/L (ref 7–15)
AST SERPL W P-5'-P-CCNC: 15 U/L (ref 0–45)
BILIRUB SERPL-MCNC: 0.3 MG/DL
BUN SERPL-MCNC: 12.8 MG/DL (ref 8–23)
CALCIUM SERPL-MCNC: 9.5 MG/DL (ref 8.8–10.4)
CHLORIDE SERPL-SCNC: 105 MMOL/L (ref 98–107)
CREAT SERPL-MCNC: 0.93 MG/DL (ref 0.67–1.17)
EGFRCR SERPLBLD CKD-EPI 2021: 89 ML/MIN/1.73M2
GLUCOSE SERPL-MCNC: 98 MG/DL (ref 70–99)
HCO3 SERPL-SCNC: 26 MMOL/L (ref 22–29)
POTASSIUM SERPL-SCNC: 4.8 MMOL/L (ref 3.4–5.3)
PROT SERPL-MCNC: 7 G/DL (ref 6.4–8.3)
SODIUM SERPL-SCNC: 142 MMOL/L (ref 135–145)

## 2024-09-09 PROCEDURE — 80053 COMPREHEN METABOLIC PANEL: CPT | Mod: ORL | Performed by: FAMILY MEDICINE

## 2024-12-16 ENCOUNTER — LAB REQUISITION (OUTPATIENT)
Dept: LAB | Facility: CLINIC | Age: 69
End: 2024-12-16
Payer: MEDICARE

## 2024-12-16 LAB
CHOLEST SERPL-MCNC: 162 MG/DL
FASTING STATUS PATIENT QL REPORTED: NORMAL
HDLC SERPL-MCNC: 46 MG/DL
LDLC SERPL CALC-MCNC: 92 MG/DL
NONHDLC SERPL-MCNC: 116 MG/DL
TRIGL SERPL-MCNC: 118 MG/DL

## 2025-01-05 ENCOUNTER — HEALTH MAINTENANCE LETTER (OUTPATIENT)
Age: 70
End: 2025-01-05

## 2025-06-30 ENCOUNTER — LAB REQUISITION (OUTPATIENT)
Dept: LAB | Facility: CLINIC | Age: 70
End: 2025-06-30
Payer: MEDICARE

## 2025-06-30 DIAGNOSIS — E11.9 TYPE 2 DIABETES MELLITUS WITHOUT COMPLICATIONS (H): ICD-10-CM

## 2025-06-30 DIAGNOSIS — M71.22 SYNOVIAL CYST OF POPLITEAL SPACE (BAKER), LEFT KNEE: ICD-10-CM

## 2025-06-30 LAB
ALBUMIN SERPL BCG-MCNC: 4.2 G/DL (ref 3.5–5.2)
ALP SERPL-CCNC: 80 U/L (ref 40–150)
ALT SERPL W P-5'-P-CCNC: 20 U/L (ref 0–70)
ANION GAP SERPL CALCULATED.3IONS-SCNC: 14 MMOL/L (ref 7–15)
AST SERPL W P-5'-P-CCNC: 15 U/L (ref 0–45)
BILIRUB SERPL-MCNC: 0.5 MG/DL
BUN SERPL-MCNC: 11.6 MG/DL (ref 8–23)
CALCIUM SERPL-MCNC: 9.8 MG/DL (ref 8.8–10.4)
CHLORIDE SERPL-SCNC: 104 MMOL/L (ref 98–107)
CHOLEST SERPL-MCNC: 119 MG/DL
CREAT SERPL-MCNC: 0.82 MG/DL (ref 0.67–1.17)
CREAT UR-MCNC: 114 MG/DL
EGFRCR SERPLBLD CKD-EPI 2021: >90 ML/MIN/1.73M2
FASTING STATUS PATIENT QL REPORTED: YES
FASTING STATUS PATIENT QL REPORTED: YES
GLUCOSE SERPL-MCNC: 117 MG/DL (ref 70–99)
HCO3 SERPL-SCNC: 24 MMOL/L (ref 22–29)
HDLC SERPL-MCNC: 42 MG/DL
LDLC SERPL CALC-MCNC: 58 MG/DL
MICROALBUMIN UR-MCNC: <12 MG/L
MICROALBUMIN/CREAT UR: NORMAL MG/G{CREAT}
NONHDLC SERPL-MCNC: 77 MG/DL
POTASSIUM SERPL-SCNC: 4.2 MMOL/L (ref 3.4–5.3)
PROT SERPL-MCNC: 6.7 G/DL (ref 6.4–8.3)
SODIUM SERPL-SCNC: 142 MMOL/L (ref 135–145)
TRIGL SERPL-MCNC: 97 MG/DL
TSH SERPL DL<=0.005 MIU/L-ACNC: 1.3 UIU/ML (ref 0.3–4.2)

## 2025-06-30 PROCEDURE — 80061 LIPID PANEL: CPT | Mod: ORL | Performed by: FAMILY MEDICINE

## 2025-06-30 PROCEDURE — 80053 COMPREHEN METABOLIC PANEL: CPT | Mod: ORL | Performed by: FAMILY MEDICINE

## 2025-06-30 PROCEDURE — 84443 ASSAY THYROID STIM HORMONE: CPT | Mod: ORL | Performed by: FAMILY MEDICINE

## 2025-06-30 PROCEDURE — 82570 ASSAY OF URINE CREATININE: CPT | Mod: ORL | Performed by: FAMILY MEDICINE

## 2025-07-03 ENCOUNTER — TRANSFERRED RECORDS (OUTPATIENT)
Dept: HEALTH INFORMATION MANAGEMENT | Facility: CLINIC | Age: 70
End: 2025-07-03
Payer: MEDICARE

## 2025-07-13 ENCOUNTER — HEALTH MAINTENANCE LETTER (OUTPATIENT)
Age: 70
End: 2025-07-13

## 2025-07-29 ENCOUNTER — TRANSFERRED RECORDS (OUTPATIENT)
Dept: HEALTH INFORMATION MANAGEMENT | Facility: CLINIC | Age: 70
End: 2025-07-29
Payer: MEDICARE

## 2025-08-19 ENCOUNTER — TRANSFERRED RECORDS (OUTPATIENT)
Dept: HEALTH INFORMATION MANAGEMENT | Facility: CLINIC | Age: 70
End: 2025-08-19
Payer: MEDICARE